# Patient Record
Sex: FEMALE | Race: WHITE | NOT HISPANIC OR LATINO | Employment: FULL TIME | URBAN - METROPOLITAN AREA
[De-identification: names, ages, dates, MRNs, and addresses within clinical notes are randomized per-mention and may not be internally consistent; named-entity substitution may affect disease eponyms.]

---

## 2017-09-19 ENCOUNTER — OFFICE VISIT (OUTPATIENT)
Dept: FAMILY MEDICINE | Facility: CLINIC | Age: 32
End: 2017-09-19
Payer: COMMERCIAL

## 2017-09-19 VITALS
BODY MASS INDEX: 40.02 KG/M2 | SYSTOLIC BLOOD PRESSURE: 118 MMHG | OXYGEN SATURATION: 98 % | HEART RATE: 74 BPM | WEIGHT: 212 LBS | RESPIRATION RATE: 16 BRPM | DIASTOLIC BLOOD PRESSURE: 74 MMHG | TEMPERATURE: 97.9 F | HEIGHT: 61 IN

## 2017-09-19 DIAGNOSIS — S93.402A SPRAIN OF LEFT ANKLE, UNSPECIFIED LIGAMENT, INITIAL ENCOUNTER: Primary | ICD-10-CM

## 2017-09-19 DIAGNOSIS — S63.502A LEFT WRIST SPRAIN, INITIAL ENCOUNTER: ICD-10-CM

## 2017-09-19 PROCEDURE — 99213 OFFICE O/P EST LOW 20 MIN: CPT | Performed by: FAMILY MEDICINE

## 2017-09-19 NOTE — MR AVS SNAPSHOT
After Visit Summary   9/19/2017    Concepción Agustin    MRN: [id number]           Patient Information     Date Of Birth          1985        Visit Information        Provider Department      9/19/2017 4:00 PM Yehuda Johnson MD [facility]        Today's Diagnoses     Sprain of left ankle, unspecified ligament, initial encounter    -  1    Left wrist sprain, initial encounter          Care Instructions      Understanding Ankle Sprain    The ankle is the joint where the leg and foot meet. Bones are held in place by connective tissue called ligaments. When ankle ligaments are stretched to the point of pain and injury, it is called an ankle sprain. A sprain can tear the ligaments. These tears can be very small but still cause pain. Ankle sprains can be mild or severe.  What causes an ankle sprain?  A sprain may occur when you twist your ankle or bend it too far. This can happen when you stumble or fall. Things that can make an ankle sprain more likely include:    Having had an ankle sprain before    Playing sports that involve running and jumping. Or playing contact sports such as football or hockey.    Wearing shoes that don t support your feet and ankles well    Having ankles with poor strength and flexibility  Symptoms of an ankle sprain  Symptoms may include:    Pain or soreness in the ankle    Swelling    Redness or bruising    Not being able to walk or put weight on the affected foot    Reduced range of motion in the ankle    A popping or tearing feeling at the time the sprain occurs    An abnormal or dislocated look to the ankle    Instability or too much range of motion in the ankle  Treatment for an ankle sprain  Treatment focuses on reducing pain and swelling, and avoiding further injury. Treatments may include:    Resting the ankle. Avoid putting weight on it. This may mean using crutches until the sprain heals.    Prescription or over-the-counter pain medicines. These help reduce  swelling and pain.    Cold packs. These help reduce pain and swelling.    Raising your ankle above your heart. This helps reduce swelling.    Wrapping the ankle with an elastic bandage or ankle brace. This helps reduce swelling and gives some support to the ankle. In rare cases, you may need a cast or boot.    Stretching and other exercises. These improve flexibility and strength.    Heat packs. These may be recommended before doing ankle exercises.  Possible complications of an ankle sprain  An ankle that has been weakened by a sprain can be more likely to have repeated sprains afterward. Doing exercises to strengthen your ankle and improve balance can reduce your risk for repeated sprains. Other possible complications are long-term (chronic) pain or an ankle that remains unstable.  When to call your healthcare provider  Call your healthcare provider right away if you have any of these:    Fever of 100.4 F (38 C) or higher, or as directed    Pain, numbness, discoloration, or coldness in the foot or toes    Pain that gets worse    Symptoms that don t get better, or get worse    New symptoms   Date Last Reviewed: 3/10/2016    0551-3051 "[employer]. [address] [place] [address]. All rights reserved. This information is not intended as a substitute for professional medical care. Always follow your healthcare professional's instructions.        Treating Ankle Sprains  Treatment will depend on how bad your sprain is. For a severe sprain, healing may take 3 months or more.  Right after your injury: Use R.I.C.E.    Rest: At first, keep weight off the ankle as much as you can. You may be given crutches to help you walk without putting weight on the ankle.    Ice: Put an ice pack on the ankle for 15 minutes. Remove the pack and wait at least 30 minutes. Repeat for up to 3 days. This helps reduce swelling.    Compression: To reduce swelling and keep the joint stable, you may need to wrap the ankle  with an elastic bandage. For more severe sprains, you may need an ankle brace or a cast.    Elevation: To reduce swelling, keep your ankle raised above your heart when you sit or lie down.  Medicine  Your healthcare provider may suggest oral non-steroidal anti-inflammatory medicine (NSAIDs), such as ibuprofen. This relieves the pain and helps reduce any swelling. Be sure to take your medicine as directed.  Contrast baths  After 3 days, soak your ankle in warm water for 30 seconds, then in cool water for 30 seconds. Go back and forth for 5 minutes. Doing this every 2 hours will help keep the swelling down.  Exercises    After about 2 to 3 weeks, you may be given exercises to strengthen the ligaments and muscles in the ankle. Doing these exercises will help prevent another ankle sprain. Exercises may include standing on your toes and then on your heels and doing ankle curls.  Ankle curls    Sit on the edge of a sturdy table or lie on your back.    Pull your toes toward you. Then point them away from you. Repeat for 2 to 3 minutes.   Date Last Reviewed: 9/28/2015 2000-2017 [employer]. [address] [place] [address]. All rights reserved. This information is not intended as a substitute for professional medical care. Always follow your healthcare professional's instructions.        Wrist Sprain  A sprain is an injury to the ligaments or capsule that holds a joint together. There are no broken bones. Most sprains take about 3 to 6 weeks to heal. If it a severe sprain where the ligament is completely torn, it can take months to recover.     Most wrist sprains are treated with a splint, wrist brace, or elastic wrap for support. Severe sprains may require surgery.  Home care    Keep your arm elevated to reduce pain and swelling. This is very important during the first 48 hours.    Apply an ice pack over the injured area for 15 to 20 minutes every 3 to 6 hours. You should do this for the first 24  to 48 hours. You can make an ice pack by filling a plastic bag that seals at the top with ice cubes and then wrapping it with a thin towel. Continue to use ice packs for relief of pain and swelling as needed. As the ice melts, be careful to avoid getting your wrap, splint, or cast wet. After 48 hours, apply heat (warm shower or warm bath) for 15 to 20 minutes several times a day, or alternate ice and heat.     You may use over-the-counter pain medicine to control pain, unless another pain medicine was prescribed. If you have chronic liver or kidney disease or ever had a stomach ulcer or GI bleeding, talk with your doctor before using these medicines.    If you were given a splint or brace, wear it for the time advised by your doctor.  Follow-up care  Follow up with your healthcare provider as advised. Any X-rays you had today don t show any broken bones, breaks, or fractures. Sometimes fractures don t show up on the first X-ray. Bruises and sprains can sometimes hurt as much as a fracture. These injuries can take time to heal completely. If your symptoms don t improve or they get worse, talk with your doctor. You may need a repeat X-ray. If X-rays were taken, you will be told of any new findings that may affect your care.  When to seek medical advice  Call your healthcare provider right away if any of these occur:    Pain or swelling increases    Fingers or hand becomes cold, blue, numb, or tingly  Date Last Reviewed: 11/20/2015 2000-2017 [employer]. [address] [place] [address], [place] [id number]. All rights reserved. This information is not intended as a substitute for professional medical care. Always follow your healthcare professional's instructions.        Understanding a Wrist Sprain    A ligament is a strong band of tissue that connects bone to bone. The wrist has many ligaments. If any of these get stretched or torn, this is called a wrist sprain. A wrist sprain can be painful. It can also limit  movement and use of the wrist and hand. Depending on the severity of the sprain, it may take a few weeks or longer for the wrist to heal.  Causes of a wrist sprain  Wrist sprains are most often caused by falling and landing on an outstretched hand. Anyone can get a wrist sprain. But the injury may be more likely in people who are very active or play sports.  Symptoms of a wrist sprain  At the time of injury, you may feel a popping or tearing in the wrist. You may have pain, redness, and swelling. You may also have some bruising. In some cases, symptoms may spread from the wrist to the hand. Until the wrist has healed, it may be hard to move or use the wrist and hand for normal tasks and activities, especially gripping and lifting.  Treating a wrist sprain  Treatment for wrist sprains may include any of the following:     Prescription or over-the-counter medicines. These help reduce pain and swelling.    A splint, brace, or cast. This is worn to support the wrist and keep the wrist from moving. You may need it for several weeks or longer, until the wrist heals.    Physical therapy and exercises. These help improve strength, flexibility, and range of motion in the wrist and hand.    Surgery. You may need surgery if the sprain is severe. For example, if the ligament is torn or if nearby tissues and bone are also injured. After surgery, you will often need to wear a splint or cast for a month or longer, until the wrist has healed.  Self-care measures  You can do several things to help relieve pain and swelling. These may include:    Limiting how much you move and use your wrist and hand    Applying an ice pack to the injured area    Keeping your wrist and hand raised (elevated) above heart level    Wrapping your wrist in an elastic bandage  Possible complications  If the injury doesn t heal properly, it has a higher chance of happening again. The injury can also become long-term (chronic). This can cause ongoing pain,  "weakness, or instability of the wrist. Over time, arthritis may develop in the wrist. This can worsen pain and cause stiffness and limited movement of the wrist and hand.        When to call your healthcare provider  Call your healthcare provider right away if you have any of these:    Fever of 100.4 F (38 C) or higher, or as directed    Symptoms that don t get better with treatment, or get worse    Hand or fingers that feel numb or very cold, turn blue or gray, or swell    Symptoms such as redness, warmth, swelling, bleeding, or drainage that occur at the incision sites. This only applies if you had surgery.    New symptoms   Date Last Reviewed: 3/10/2016    9496-3748 [employer]. [address] [place] [address], [place] [address]. All rights reserved. This information is not intended as a substitute for professional medical care. Always follow your healthcare professional's instructions.                Follow-ups after your visit        Who to contact     If you have questions or need follow up information about today's clinic visit or your schedule please contact [facility] directly at [phone].  Normal or non-critical lab and imaging results will be communicated to you by Product[employer], letter or phone within 4 business days after the clinic has received the results. If you do not hear from us within 7 days, please contact the clinic through [employer] or phone. If you have a critical or abnormal lab result, we will notify you by phone as soon as possible.  Submit refill requests through [employer] or call your pharmacy and they will forward the refill request to us. Please allow 3 business days for your refill to be completed.          Additional Information About Your Visit        [employer] Information     [employer] lets you send messages to your doctor, view your test results, renew your prescriptions, schedule appointments and more. To sign up, go to [email or web address] . Click on \"Log in\" on " "the left side of the screen, which will take you to the Welcome page. Then click on \"Sign up Now\" on the right side of the page.     You will be asked to enter the access code listed below, as well as some personal information. Please follow the directions to create your username and password.     Your access code is: [email or web address]  Expires: 2017  3:36 PM     Your access code will  in 90 days. If you need help or a new code, please call your [facility] or [phone].        Care EveryWhere ID     This is your Care EveryWhere ID. This could be used by other organizations to access your [facility] medical records  [id number]        Your Vitals Were     Pulse Temperature Respirations Height Pulse Oximetry       74 97.9  F (36.6  C) (Oral) 16 5' 1\" (1.549 m) 98%     Breastfeeding? BMI (Body Mass Index)                Yes 40.06 kg/m2           Blood Pressure from Last 3 Encounters:   17 118/74    Weight from Last 3 Encounters:   17 212 lb (96.2 kg)              Today, you had the following     No orders found for display         Today's Medication Changes          These changes are accurate as of: 17  4:50 PM.  If you have any questions, ask your nurse or doctor.               Start taking these medicines.        Dose/Directions    order for DME   Used for:  Sprain of left ankle, unspecified ligament, initial encounter, Left wrist sprain, initial encounter   Started by:  Yehuda Johnson MD        Left wrist splint and ACE wrap for left ankle   Quantity:  1 each   Refills:  0            Where to get your medicines      Some of these will need a paper prescription and others can be bought over the counter.  Ask your nurse if you have questions.     Bring a paper prescription for each of these medications     order for DME                Primary Care Provider    None Specified       No primary provider on file.        Equal Access to Services     NIMISHA TRAN: Ki ibrahim " Janinalakesha, haleyda lumarquitaadaha, thanh kalucius taveras, jerad willssole elisabet. So [facility] [phone].    ATENCIÓN: Si leonla kelsi, tiene a bucio disposición servicios gratuitos de asistencia lingüística. Celeste al [phone].    We comply with applicable federal civil rights laws and [place] laws. We do not discriminate on the basis of race, color, national origin, age, disability sex, sexual orientation or gender identity.            Thank you!     Thank you for choosing [facility]  for your care. Our goal is always to provide you with excellent care. Hearing back from our patients is one way we can continue to improve our services. Please take a few minutes to complete the written survey that you may receive in the mail after your visit with us. Thank you!             Your Updated Medication List - Protect others around you: Learn how to safely use, store and throw away your medicines at www.disposemymeds.org.          This list is accurate as of: 9/19/17  4:50 PM.  Always use your most recent med list.                   Brand Name Dispense Instructions for use Diagnosis    order for DME     1 each    Left wrist splint and ACE wrap for left ankle    Sprain of left ankle, unspecified ligament, initial encounter, Left wrist sprain, initial encounter       UNKNOWN MED DOSAGE      daily Birth control pill

## 2017-09-19 NOTE — NURSING NOTE
"Chief Complaint   Patient presents with     Work Comp     left ankle injury x August 39. 2017     Thyroid Problem     discuss       Initial /74 (BP Location: Right arm, Patient Position: Chair, Cuff Size: Adult Regular)  Pulse 74  Temp 97.9  F (36.6  C) (Oral)  Resp 16  Ht 5' 1\" (1.549 m)  Wt 212 lb (96.2 kg)  LMP   SpO2 98%  Breastfeeding? Yes  BMI 40.06 kg/m2 Estimated body mass index is 40.06 kg/(m^2) as calculated from the following:    Height as of this encounter: 5' 1\" (1.549 m).    Weight as of this encounter: 212 lb (96.2 kg).  Medication Reconciliation: complete     Hussein Shaw CMA    RUBY done from [place], not sure of her last pap and not sure of TDAP as of 9/19/2017.Hussein Shaw CMA      "

## 2017-09-19 NOTE — PROGRESS NOTES
"  SUBJECTIVE:   Concepción Agustin is a 32 year old female who presents to clinic today for the following health issues:      Musculoskeletal problem/pain      Duration: x 30 AUG 2017    Description  Location: left ankle    Intensity:  moderate    Accompanying signs and symptoms: left calf pain at times, also left wrist from falling    History  Previous similar problem: no   Previous evaluation:  none    Precipitating or alleviating factors:  Trauma or overuse: YES- fell off some steps  Aggravating factors include: standing, climbing stairs, lifting, exercise and overuse    Therapies tried and outcome: OTC pain meds, and icy hot        {additional problems for provider to add:002436}    Problem list and histories reviewed & adjusted, as indicated.  Additional history: {NONE - AS DOCUMENTED:826952::\"as documented\"}    {HIST REVIEW/ LINKS 2:625417}    Reviewed and updated as needed this visit by clinical staff     Reviewed and updated as needed this visit by Provider         {PROVIDER CHARTING PREFERENCE:687026}    "

## 2017-09-19 NOTE — PATIENT INSTRUCTIONS
Understanding Ankle Sprain    The ankle is the joint where the leg and foot meet. Bones are held in place by connective tissue called ligaments. When ankle ligaments are stretched to the point of pain and injury, it is called an ankle sprain. A sprain can tear the ligaments. These tears can be very small but still cause pain. Ankle sprains can be mild or severe.  What causes an ankle sprain?  A sprain may occur when you twist your ankle or bend it too far. This can happen when you stumble or fall. Things that can make an ankle sprain more likely include:    Having had an ankle sprain before    Playing sports that involve running and jumping. Or playing contact sports such as football or hockey.    Wearing shoes that don t support your feet and ankles well    Having ankles with poor strength and flexibility  Symptoms of an ankle sprain  Symptoms may include:    Pain or soreness in the ankle    Swelling    Redness or bruising    Not being able to walk or put weight on the affected foot    Reduced range of motion in the ankle    A popping or tearing feeling at the time the sprain occurs    An abnormal or dislocated look to the ankle    Instability or too much range of motion in the ankle  Treatment for an ankle sprain  Treatment focuses on reducing pain and swelling, and avoiding further injury. Treatments may include:    Resting the ankle. Avoid putting weight on it. This may mean using crutches until the sprain heals.    Prescription or over-the-counter pain medicines. These help reduce swelling and pain.    Cold packs. These help reduce pain and swelling.    Raising your ankle above your heart. This helps reduce swelling.    Wrapping the ankle with an elastic bandage or ankle brace. This helps reduce swelling and gives some support to the ankle. In rare cases, you may need a cast or boot.    Stretching and other exercises. These improve flexibility and strength.    Heat packs. These may be recommended before doing  ankle exercises.  Possible complications of an ankle sprain  An ankle that has been weakened by a sprain can be more likely to have repeated sprains afterward. Doing exercises to strengthen your ankle and improve balance can reduce your risk for repeated sprains. Other possible complications are long-term (chronic) pain or an ankle that remains unstable.  When to call your healthcare provider  Call your healthcare provider right away if you have any of these:    Fever of 100.4 F (38 C) or higher, or as directed    Pain, numbness, discoloration, or coldness in the foot or toes    Pain that gets worse    Symptoms that don t get better, or get worse    New symptoms   Date Last Reviewed: 3/10/2016    5684-5295 [employer]. [address] [place] [address], [place] [id number]. All rights reserved. This information is not intended as a substitute for professional medical care. Always follow your healthcare professional's instructions.        Treating Ankle Sprains  Treatment will depend on how bad your sprain is. For a severe sprain, healing may take 3 months or more.  Right after your injury: Use R.I.C.E.    Rest: At first, keep weight off the ankle as much as you can. You may be given crutches to help you walk without putting weight on the ankle.    Ice: Put an ice pack on the ankle for 15 minutes. Remove the pack and wait at least 30 minutes. Repeat for up to 3 days. This helps reduce swelling.    Compression: To reduce swelling and keep the joint stable, you may need to wrap the ankle with an elastic bandage. For more severe sprains, you may need an ankle brace or a cast.    Elevation: To reduce swelling, keep your ankle raised above your heart when you sit or lie down.  Medicine  Your healthcare provider may suggest oral non-steroidal anti-inflammatory medicine (NSAIDs), such as ibuprofen. This relieves the pain and helps reduce any swelling. Be sure to take your medicine as directed.  Contrast baths  After 3  days, soak your ankle in warm water for 30 seconds, then in cool water for 30 seconds. Go back and forth for 5 minutes. Doing this every 2 hours will help keep the swelling down.  Exercises    After about 2 to 3 weeks, you may be given exercises to strengthen the ligaments and muscles in the ankle. Doing these exercises will help prevent another ankle sprain. Exercises may include standing on your toes and then on your heels and doing ankle curls.  Ankle curls    Sit on the edge of a sturdy table or lie on your back.    Pull your toes toward you. Then point them away from you. Repeat for 2 to 3 minutes.   Date Last Reviewed: 9/28/2015 2000-2017 [employer]. [address] [place] [address], [place] [id number]. All rights reserved. This information is not intended as a substitute for professional medical care. Always follow your healthcare professional's instructions.        Wrist Sprain  A sprain is an injury to the ligaments or capsule that holds a joint together. There are no broken bones. Most sprains take about 3 to 6 weeks to heal. If it a severe sprain where the ligament is completely torn, it can take months to recover.     Most wrist sprains are treated with a splint, wrist brace, or elastic wrap for support. Severe sprains may require surgery.  Home care    Keep your arm elevated to reduce pain and swelling. This is very important during the first 48 hours.    Apply an ice pack over the injured area for 15 to 20 minutes every 3 to 6 hours. You should do this for the first 24 to 48 hours. You can make an ice pack by filling a plastic bag that seals at the top with ice cubes and then wrapping it with a thin towel. Continue to use ice packs for relief of pain and swelling as needed. As the ice melts, be careful to avoid getting your wrap, splint, or cast wet. After 48 hours, apply heat (warm shower or warm bath) for 15 to 20 minutes several times a day, or alternate ice and heat.     You may  use over-the-counter pain medicine to control pain, unless another pain medicine was prescribed. If you have chronic liver or kidney disease or ever had a stomach ulcer or GI bleeding, talk with your doctor before using these medicines.    If you were given a splint or brace, wear it for the time advised by your doctor.  Follow-up care  Follow up with your healthcare provider as advised. Any X-rays you had today don t show any broken bones, breaks, or fractures. Sometimes fractures don t show up on the first X-ray. Bruises and sprains can sometimes hurt as much as a fracture. These injuries can take time to heal completely. If your symptoms don t improve or they get worse, talk with your doctor. You may need a repeat X-ray. If X-rays were taken, you will be told of any new findings that may affect your care.  When to seek medical advice  Call your healthcare provider right away if any of these occur:    Pain or swelling increases    Fingers or hand becomes cold, blue, numb, or tingly  Date Last Reviewed: 11/20/2015 2000-2017 [employer]. [address] [place] [address]. All rights reserved. This information is not intended as a substitute for professional medical care. Always follow your healthcare professional's instructions.        Understanding a Wrist Sprain    A ligament is a strong band of tissue that connects bone to bone. The wrist has many ligaments. If any of these get stretched or torn, this is called a wrist sprain. A wrist sprain can be painful. It can also limit movement and use of the wrist and hand. Depending on the severity of the sprain, it may take a few weeks or longer for the wrist to heal.  Causes of a wrist sprain  Wrist sprains are most often caused by falling and landing on an outstretched hand. Anyone can get a wrist sprain. But the injury may be more likely in people who are very active or play sports.  Symptoms of a wrist sprain  At the time of injury, you may  feel a popping or tearing in the wrist. You may have pain, redness, and swelling. You may also have some bruising. In some cases, symptoms may spread from the wrist to the hand. Until the wrist has healed, it may be hard to move or use the wrist and hand for normal tasks and activities, especially gripping and lifting.  Treating a wrist sprain  Treatment for wrist sprains may include any of the following:     Prescription or over-the-counter medicines. These help reduce pain and swelling.    A splint, brace, or cast. This is worn to support the wrist and keep the wrist from moving. You may need it for several weeks or longer, until the wrist heals.    Physical therapy and exercises. These help improve strength, flexibility, and range of motion in the wrist and hand.    Surgery. You may need surgery if the sprain is severe. For example, if the ligament is torn or if nearby tissues and bone are also injured. After surgery, you will often need to wear a splint or cast for a month or longer, until the wrist has healed.  Self-care measures  You can do several things to help relieve pain and swelling. These may include:    Limiting how much you move and use your wrist and hand    Applying an ice pack to the injured area    Keeping your wrist and hand raised (elevated) above heart level    Wrapping your wrist in an elastic bandage  Possible complications  If the injury doesn t heal properly, it has a higher chance of happening again. The injury can also become long-term (chronic). This can cause ongoing pain, weakness, or instability of the wrist. Over time, arthritis may develop in the wrist. This can worsen pain and cause stiffness and limited movement of the wrist and hand.        When to call your healthcare provider  Call your healthcare provider right away if you have any of these:    Fever of 100.4 F (38 C) or higher, or as directed    Symptoms that don t get better with treatment, or get worse    Hand or fingers  that feel numb or very cold, turn blue or gray, or swell    Symptoms such as redness, warmth, swelling, bleeding, or drainage that occur at the incision sites. This only applies if you had surgery.    New symptoms   Date Last Reviewed: 3/10/2016    [id number] [employer]. [address] [place] [address]. All rights reserved. This information is not intended as a substitute for professional medical care. Always follow your healthcare professional's instructions.

## 2017-09-27 NOTE — PROGRESS NOTES
"SUBJECTIVE:  Concepción Agustin, a 32 year old female scheduled an appointment to discuss the following issues:     Sprain of left ankle, unspecified ligament, initial encounter  Left wrist sprain, initial encounter    Medical, social, surgical, and family histories reviewed.    Musculoskeletal problem/pain       Duration: x 30 AUG 2017    Description  Location: left ankle    Intensity:  moderate    Accompanying signs and symptoms: left calf pain at times, also left wrist from falling    History  Previous similar problem: no   Previous evaluation:  none    Precipitating or alleviating factors:  Trauma or overuse: YES- fell off some steps  Aggravating factors include: standing, climbing stairs, lifting, exercise and overuse    Therapies tried and outcome: OTC pain meds, and icy hot    On 8/30/17, Left ankle injury---tripped at bottom steps of staircase; inversion injury.  Also fell onto left side and hurt her left wrist.  Increased pain since then.  No open wound or paresthesia.  No head/neck/trunk injuries.    ROS:  See HPI.  No nausea/vomiting.  No fever/chills.  No chest pain/SOB.  No BM/urine problems.  No dizziness or syncope.      OBJECTIVE:  /74 (BP Location: Right arm, Patient Position: Chair, Cuff Size: Adult Regular)  Pulse 74  Temp 97.9  F (36.6  C) (Oral)  Resp 16  Ht 5' 1\" (1.549 m)  Wt 212 lb (96.2 kg)  LMP   SpO2 98%  Breastfeeding? Yes  BMI 40.06 kg/m2  EXAM:  GENERAL APPEARANCE: alert and no distress; no cyanosis or accessory muscle use; moist mucus membrane  EYES: Eyes grossly normal to inspection, PERRL and conjunctivae and sclerae normal  HENT: ear canals and TM's normal and nose and mouth without ulcers or lesions  NECK: no adenopathy, no asymmetry, masses, or scars and thyroid normal to palpation  RESP: lungs clear to auscultation - no rales, rhonchi or wheezes  CV: regular rates and rhythm, normal S1 S2, no S3 or S4 and no murmur, click or rub  MS: extremities normal- no gross " deformities noted; left ankle---no swelling or bruising, pain along the dorsal tendons, no bony tenderness of malleoli or metatarsals or navicular bones; gait normal, no open wound or paresthesia; left wrist---no snuff box tenderness, pain along dorsal tendons, no swelling or bruising, no bony tenderness; normal ROM all ankles and wrists.  SKIN: no suspicious lesions or rashes  NEURO: Normal strength and tone, mentation intact and speech normal; neurovascularly intact bilateral upper and lower extremities    ASSESSMENT/PLAN:  (S93.402A) Sprain of left ankle, unspecified ligament, initial encounter  (primary encounter diagnosis)  Comment: xray not indicated according to [facility] ankle rules.  Plan: order for DME       (S63.127A) Left wrist sprain, initial encounter  Plan: order for DME       Rest, ice, elevation, compression (splints given). Tylenol/Ibuprofen PRN pain.  Care instructions given.  Pt to f/up PCP if no improvement or worsening.  Warning signs and symptoms explained.

## 2017-10-09 ENCOUNTER — OFFICE VISIT (OUTPATIENT)
Dept: FAMILY MEDICINE | Facility: CLINIC | Age: 32
End: 2017-10-09
Payer: COMMERCIAL

## 2017-10-09 VITALS
WEIGHT: 211 LBS | HEIGHT: 61 IN | SYSTOLIC BLOOD PRESSURE: 111 MMHG | TEMPERATURE: 98.2 F | RESPIRATION RATE: 16 BRPM | HEART RATE: 72 BPM | DIASTOLIC BLOOD PRESSURE: 73 MMHG | BODY MASS INDEX: 39.84 KG/M2

## 2017-10-09 DIAGNOSIS — L65.9 ALOPECIA: Primary | ICD-10-CM

## 2017-10-09 LAB — TSH SERPL DL<=0.005 MIU/L-ACNC: 1.41 MU/L (ref 0.4–4)

## 2017-10-09 PROCEDURE — 84443 ASSAY THYROID STIM HORMONE: CPT | Performed by: FAMILY MEDICINE

## 2017-10-09 PROCEDURE — 36415 COLL VENOUS BLD VENIPUNCTURE: CPT | Performed by: FAMILY MEDICINE

## 2017-10-09 PROCEDURE — 99213 OFFICE O/P EST LOW 20 MIN: CPT | Performed by: FAMILY MEDICINE

## 2017-10-09 RX ORDER — ACETAMINOPHEN AND CODEINE PHOSPHATE 120; 12 MG/5ML; MG/5ML
SOLUTION ORAL
Refills: 4 | COMMUNITY
Start: 2017-09-28 | End: 2018-02-19

## 2017-10-09 NOTE — PROGRESS NOTES
"  SUBJECTIVE:   Concepción Agustin is a 32 year old female who presents to clinic today for the following health issues:    Alopecia  (primary encounter diagnosis): The patient noticed significant hair loss after birth of her twin boys,Now 5 mos ago,  notes that she has had hair loss with previous pregnancies but not as dramatic as this time. She would like to have her thyroid checked.     Problem list and histories reviewed & adjusted, as indicated.  Additional history: none    Reviewed and updated as needed this visit by clinical staff  Tobacco  Allergies  Meds  Med Hx  Surg Hx  Fam Hx  Soc Hx       History reviewed. No pertinent family history.  Thytoid disease      Social History   Substance Use Topics     Smoking status: Never Smoker     Smokeless tobacco: Never Used     Alcohol use No     Reviewed and updated as needed this visit by Provider         ROS:no other symptoms of thyroid dysfx, no systemic symptoms      This document serves as a record of the services and decisions personally performed and made by Timmy Roberts MD. It was created on his behalf by Sienna Royal, a trained medical scribe.  The creation of this document is based on the scribe's personal observations and the provider's statements to the medical scribe.  Sienna Royal, October 9, 2017 10:07 AM    OBJECTIVE:     /73 (BP Location: Right arm, Patient Position: Chair, Cuff Size: Adult Large)  Pulse 72  Temp 98.2  F (36.8  C) (Oral)  Resp 16  Ht 1.549 m (5' 1\")  Wt 95.7 kg (211 lb)  LMP   Breastfeeding? Yes  BMI 39.87 kg/m2  Body mass index is 39.87 kg/(m^2).    No thyromegaly, trachea midline    Vertex alopecia    ASSESSMENT/PLAN:     ASSESSMENT / PLAN:  (L65.9) Alopecia  (primary encounter diagnosis)  Comment: broaden database. Telogen effluvium possible  Plan: TSH with free T4 reflex Rogaine          The information in this document, created by the medical scribe for me, accurately reflects the services I personally performed " and the decisions made by me. I have reviewed and approved this document for accuracy prior to leaving the patient care area.  Timmy Roberts MD October 9, 2017 10:07 AM    Timmy Roberts MD  [facility]

## 2017-10-09 NOTE — LETTER
October 10, 2017      Concepción Agustin  [address]  [place] [facility] [address]        Dear ,      We are writing to inform you of your test results.    Thyroid tests normal. Rogaine!    Resulted Orders   TSH with free T4 reflex   Result Value Ref Range    TSH 1.41 0.40 - 4.00 mU/L       If you have any questions or concerns, please call the clinic at the number listed above.         Sincerely,        Timmy Roberts MD

## 2017-10-09 NOTE — MR AVS SNAPSHOT
"              After Visit Summary   10/9/2017    Concepción Agustin    MRN: [id number]           Patient Information     Date Of Birth          1985        Visit Information        Provider Department      10/9/2017 10:15 AM Timmy Roberts MD [facility]        Today's Diagnoses     Alopecia    -  1       Follow-ups after your visit        Who to contact     If you have questions or need follow up information about today's clinic visit or your schedule please contact [facility] directly at [phone].  Normal or non-critical lab and imaging results will be communicated to you by MyChart, letter or phone within 4 business days after the clinic has received the results. If you do not hear from us within 7 days, please contact the clinic through [email or web address] or phone. If you have a critical or abnormal lab result, we will notify you by phone as soon as possible.  Submit refill requests through [employer] or call your pharmacy and they will forward the refill request to us. Please allow 3 business days for your refill to be completed.          Additional Information About Your Visit        MyChart Information     [employer] lets you send messages to your doctor, view your test results, renew your prescriptions, schedule appointments and more. To sign up, go to [email or web address] . Click on \"Log in\" on the left side of the screen, which will take you to the Welcome page. Then click on \"Sign up Now\" on the right side of the page.     You will be asked to enter the access code listed below, as well as some personal information. Please follow the directions to create your username and password.     Your access code is: [email or web address]  Expires: 2017  3:36 PM     Your access code will  in 90 days. If you need help or a new code, please call your [facility] or [phone].        Care EveryWhere ID     This is your Care EveryWhere ID. This could be used by other organizations to " "access your [place] medical records  [id number]        Your Vitals Were     Pulse Temperature Respirations Height Breastfeeding?       72 98.2  F (36.8  C) (Oral) 16 5' 1\" (1.549 m) Yes     BMI (Body Mass Index)                   39.87 kg/m2            Blood Pressure from Last 3 Encounters:   10/09/17 111/73   09/19/17 118/74    Weight from Last 3 Encounters:   10/09/17 211 lb (95.7 kg)   09/19/17 212 lb (96.2 kg)              We Performed the Following     TSH with free T4 reflex        Primary Care Provider    None Specified       No primary provider on file.        Equal Access to Services     [facility]: Hadii francisca Christian, renetta martin, thanh kaalmada darcy, jerad franklin . So [facility] [phone].    ATENCIÓN: Si habla español, tiene a bucio disposición servicios gratuitos de asistencia lingüística. Llame al [phone].    We comply with applicable federal civil rights laws and [place] laws. We do not discriminate on the basis of race, color, national origin, age, disability, sex, sexual orientation, or gender identity.            Thank you!     Thank you for choosing [facility]  for your care. Our goal is always to provide you with excellent care. Hearing back from our patients is one way we can continue to improve our services. Please take a few minutes to complete the written survey that you may receive in the mail after your visit with us. Thank you!             Your Updated Medication List - Protect others around you: Learn how to safely use, store and throw away your medicines at www.disposemymeds.org.          This list is accurate as of: 10/9/17  1:58 PM.  Always use your most recent med list.                   Brand Name Dispense Instructions for use Diagnosis    norethindrone 0.35 MG per tablet    MICRONOR     TAKE 1 TABLET BY MOUTH DAILY START FIRST TABLET TODAY        order for DME     1 each    Left wrist splint and ACE wrap for " left ankle    Sprain of left ankle, unspecified ligament, initial encounter, Left wrist sprain, initial encounter

## 2017-10-09 NOTE — NURSING NOTE
"Chief Complaint   Patient presents with     Thyroid Problem     hair loss and joint pain in wrist and ankle        Initial /73 (BP Location: Right arm, Patient Position: Chair, Cuff Size: Adult Large)  Pulse 72  Temp 98.2  F (36.8  C) (Oral)  Resp 16  Ht 5' 1\" (1.549 m)  Wt 211 lb (95.7 kg)  LMP   Breastfeeding? Yes  BMI 39.87 kg/m2 Estimated body mass index is 39.87 kg/(m^2) as calculated from the following:    Height as of this encounter: 5' 1\" (1.549 m).    Weight as of this encounter: 211 lb (95.7 kg).  Medication Reconciliation: complete rt arm Lana Corrigan MA      "

## 2017-11-17 ENCOUNTER — TELEPHONE (OUTPATIENT)
Dept: FAMILY MEDICINE | Facility: CLINIC | Age: 32
End: 2017-11-17

## 2017-11-17 NOTE — TELEPHONE ENCOUNTER
Panel Management Review      Patient has the following on her problem list: None      Composite cancer screening  Chart review shows that this patient is due/due soon for the following Pap Smear  Summary:    Patient is due/failing the following:   PAP    Action needed:   Patient needs office visit for PAP.    Type of outreach:    Phone, left message for patient to call back.     Questions for provider review:    None                                                                                                                                    Lana Corrigan MA       Chart routed to Care Team [id number]   .

## 2018-02-08 ENCOUNTER — TELEPHONE (OUTPATIENT)
Dept: FAMILY MEDICINE | Facility: CLINIC | Age: 33
End: 2018-02-08

## 2018-02-08 NOTE — TELEPHONE ENCOUNTER
Panel Management Review      Patient has the following on her problem list: None      Composite cancer screening  Chart review shows that this patient is due/due soon for the following Pap Smear  Summary:    Patient is due/failing the following:   PAP    Action needed:   Patient needs office visit for PAP.    Type of outreach:    Phone, spoke to patient.  Pt scheduled appointment     Questions for provider review:    None                                                                                                                                    Lana Corrigan MA

## 2018-02-19 ENCOUNTER — OFFICE VISIT (OUTPATIENT)
Dept: FAMILY MEDICINE | Facility: CLINIC | Age: 33
End: 2018-02-19
Payer: COMMERCIAL

## 2018-02-19 VITALS
RESPIRATION RATE: 16 BRPM | WEIGHT: 212 LBS | HEIGHT: 61 IN | TEMPERATURE: 98.1 F | SYSTOLIC BLOOD PRESSURE: 118 MMHG | BODY MASS INDEX: 40.02 KG/M2 | OXYGEN SATURATION: 100 % | HEART RATE: 80 BPM | DIASTOLIC BLOOD PRESSURE: 78 MMHG

## 2018-02-19 DIAGNOSIS — F33.0 MILD RECURRENT MAJOR DEPRESSION (H): ICD-10-CM

## 2018-02-19 DIAGNOSIS — Z30.41 ENCOUNTER FOR SURVEILLANCE OF CONTRACEPTIVE PILLS: ICD-10-CM

## 2018-02-19 DIAGNOSIS — Z00.00 WELLNESS EXAMINATION: Primary | ICD-10-CM

## 2018-02-19 PROCEDURE — 99395 PREV VISIT EST AGE 18-39: CPT | Performed by: NURSE PRACTITIONER

## 2018-02-19 PROCEDURE — G0145 SCR C/V CYTO,THINLAYER,RESCR: HCPCS | Performed by: NURSE PRACTITIONER

## 2018-02-19 PROCEDURE — 87624 HPV HI-RISK TYP POOLED RSLT: CPT | Performed by: NURSE PRACTITIONER

## 2018-02-19 RX ORDER — CLOTRIMAZOLE 1 %
CREAM (GRAM) TOPICAL 2 TIMES DAILY
COMMUNITY
End: 2023-03-21

## 2018-02-19 RX ORDER — ACETAMINOPHEN AND CODEINE PHOSPHATE 120; 12 MG/5ML; MG/5ML
1 SOLUTION ORAL DAILY
Qty: 84 TABLET | Refills: 3 | Status: SHIPPED | OUTPATIENT
Start: 2018-02-19 | End: 2019-02-26

## 2018-02-19 ASSESSMENT — PATIENT HEALTH QUESTIONNAIRE - PHQ9
SUM OF ALL RESPONSES TO PHQ QUESTIONS 1-9: 14
SUM OF ALL RESPONSES TO PHQ QUESTIONS 1-9: 14
10. IF YOU CHECKED OFF ANY PROBLEMS, HOW DIFFICULT HAVE THESE PROBLEMS MADE IT FOR YOU TO DO YOUR WORK, TAKE CARE OF THINGS AT HOME, OR GET ALONG WITH OTHER PEOPLE: SOMEWHAT DIFFICULT

## 2018-02-19 NOTE — LETTER
February 28, 2018    Concepción Agustin  [address]  [place] [address]    Dear Concepción,  We are happy to inform you that your PAP smear result from 02/19/18 is normal.  We are now able to do a follow up test on PAP smears. The DNA test is for HPV (Human Papilloma Virus). Cervical cancer is closely linked with certain types of HPV. Your result showed no evidence of high risk HPV.  Therefore we recommend you return in 3 years for your next pap smear.  You will still need to return to the clinic every year for an annual exam and other preventive tests.  Please contact the clinic at [phone] with any questions.  Sincerely,    Susan Haase, APRN CNP/esh

## 2018-02-19 NOTE — MR AVS SNAPSHOT
After Visit Summary   2/19/2018    Concepción Agustin    MRN: [id number]           Patient Information     Date Of Birth          1985        Visit Information        Provider Department      2/19/2018 11:00 AM Haase, Susan Rachele, APRN [facility]        Today's Diagnoses     Wellness examination    -  1      Care Instructions      Preventive Health Recommendations  Female Ages 26 - 39  Yearly exam:   See your health care provider every year in order to    Review health changes.     Discuss preventive care.      Review your medicines if you your doctor has prescribed any.    Until age 30: Get a Pap test every three years (more often if you have had an abnormal result).    After age 30: Talk to your doctor about whether you should have a Pap test every 3 years or have a Pap test with HPV screening every 5 years.   You do not need a Pap test if your uterus was removed (hysterectomy) and you have not had cancer.  You should be tested each year for STDs (sexually transmitted diseases), if you're at risk.   Talk to your provider about how often to have your cholesterol checked.  If you are at risk for diabetes, you should have a diabetes test (fasting glucose).  Shots: Get a flu shot each year. Get a tetanus shot every 10 years.   Nutrition:     Eat at least 5 servings of fruits and vegetables each day.    Eat whole-grain bread, whole-wheat pasta and brown rice instead of white grains and rice.    Talk to your provider about Calcium and Vitamin D.     Lifestyle    Exercise at least 150 minutes a week (30 minutes a day, 5 days of the week). This will help you control your weight and prevent disease.    Limit alcohol to one drink per day.    No smoking.     Wear sunscreen to prevent skin cancer.    See your dentist every six months for an exam and cleaning.            Follow-ups after your visit        Follow-up notes from your care team     Return in about 1 year (around 2/19/2019) for  "Physical Exam.      Future tests that were ordered for you today     Open Future Orders        Priority Expected Expires Ordered    CBC with platelets Routine  2018    Comprehensive metabolic panel Routine  2018    Lipid panel reflex to direct LDL Fasting Routine  2018    Vitamin D Deficiency Routine  2018            Who to contact     If you have questions or need follow up information about today's clinic visit or your schedule please contact [facility] directly at [phone].  Normal or non-critical lab and imaging results will be communicated to you by Big Stagehart, letter or phone within 4 business days after the clinic has received the results. If you do not hear from us within 7 days, please contact the clinic through Laboratory Partnerst or phone. If you have a critical or abnormal lab result, we will notify you by phone as soon as possible.  Submit refill requests through STEARCLEAR or call your pharmacy and they will forward the refill request to us. Please allow 3 business days for your refill to be completed.          Additional Information About Your Visit        Big StageharCustomer BOOM (formerly [employer] BOOM) Information     STEARCLEAR lets you send messages to your doctor, view your test results, renew your prescriptions, schedule appointments and more. To sign up, go to [email or web address] . Click on \"Log in\" on the left side of the screen, which will take you to the Welcome page. Then click on \"Sign up Now\" on the right side of the page.     You will be asked to enter the access code listed below, as well as some personal information. Please follow the directions to create your username and password.     Your access code is: [email or web address]  Expires: 2018 11:29 AM     Your access code will  in 90 days. If you need help or a new code, please call your [facility] or [phone].        Care EveryWhere ID     This is your Care EveryWhere ID. This could be used by " "other organizations to access your [place] medical records  [id number]        Your Vitals Were     Pulse Temperature Respirations Height Last Period Pulse Oximetry    80 98.1  F (36.7  C) (Oral) 16 5' 1\" (1.549 m) 02/11/2018 100%    BMI (Body Mass Index)                   40.06 kg/m2            Blood Pressure from Last 3 Encounters:   02/19/18 118/78   10/09/17 111/73   09/19/17 118/74    Weight from Last 3 Encounters:   02/19/18 212 lb (96.2 kg)   10/09/17 211 lb (95.7 kg)   09/19/17 212 lb (96.2 kg)              We Performed the Following     HPV High Risk Types DNA Cervical     Pap imaged thin layer screen with HPV - recommended age 30 - 65 years (select HPV order below)          Today's Medication Changes          These changes are accurate as of 2/19/18 11:29 AM.  If you have any questions, ask your nurse or doctor.               These medicines have changed or have updated prescriptions.        Dose/Directions    norethindrone 0.35 MG per tablet   Commonly known as:  MICRONOR   This may have changed:  See the new instructions.   Used for:  Wellness examination   Changed by:  Haase, Susan Rachele, APRN CNP        Dose:  1 tablet   Take 1 tablet (0.35 mg) by mouth daily   Quantity:  84 tablet   Refills:  3            Where to get your medicines      These medications were sent to [place] [address] [place] [address] [address]    Hours:  Tech issues with their phone system Phone:  [phone]     norethindrone 0.35 MG per tablet                Primary Care Provider Office Phone # Fax #    Susan Rachele Haase, APRN -[phone] [address]  [facility] [address]        Equal Access to Services     NIMISHA PEREZ AH: Hadii francisca ibrahim Solakesha, waaxda luqadaha, qaybta kaalmada adeegyada, jerad bhandari. So [facility] [phone].    ATENCIÓN: Si habla español, tiene a bucio disposición servicios gratuitos de " asistencia lingüística. Celeste al [phone].    We comply with applicable federal civil rights laws and [place] laws. We do not discriminate on the basis of race, color, national origin, age, disability, sex, sexual orientation, or gender identity.            Thank you!     Thank you for choosing [facility]  for your care. Our goal is always to provide you with excellent care. Hearing back from our patients is one way we can continue to improve our services. Please take a few minutes to complete the written survey that you may receive in the mail after your visit with us. Thank you!             Your Updated Medication List - Protect others around you: Learn how to safely use, store and throw away your medicines at www.disposemymeds.org.          This list is accurate as of 2/19/18 11:29 AM.  Always use your most recent med list.                   Brand Name Dispense Instructions for use Diagnosis    clotrimazole 1 % cream    LOTRIMIN     Apply topically 2 times daily        norethindrone 0.35 MG per tablet    MICRONOR    84 tablet    Take 1 tablet (0.35 mg) by mouth daily    Wellness examination

## 2018-02-19 NOTE — LETTER
[facility]  [address] [place] [address]  [phone]        July 6, 2018    Concepción Agustin  [address]  [facility] [address]              Dear Concepción Agustin    This is to remind you that your fasting lab work is due.  Please fast 10-12 hours. You may drink water prior to your appointment.     You may call our office at [phone] to schedule an appointment.    Please disregard this notice if you have already had your labs drawn or made an appointment.        Sincerely,        Susan Rachele Haase APRN, CNP/ ilan

## 2018-02-19 NOTE — PROGRESS NOTES
"   SUBJECTIVE:   CC: Concepción Agustin is an 32 year old woman who presents for preventive health visit.     Physical   Annual:     Getting at least 3 servings of Calcium per day::  NO    Bi-annual eye exam::  NO    Dental care twice a year::  NO    Sleep apnea or symptoms of sleep apnea::  Daytime drowsiness    Diet::  Regular (no restrictions)    Frequency of exercise::  None    Taking medications regularly::  Yes    Medication side effects::  None    Additional concerns today::  No          Answers for HPI/ROS submitted by the patient on 2/19/2018   PHQ-2 Score: 4  If you checked off any problems, how difficult have these problems made it for you to do your work, take care of things at home, or get along with other people?: Somewhat difficult  PHQ9 TOTAL SCORE: 14    Social: 9 month twin boys, 3 and 7 year old girls. 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 Weaning from breastfeeding twins due to nipple pain.     Depression: Sleep deprived worsens symptoms but are manageable. Denies thoughts of harming self, \"whose going to take care of my kids?\". She's not currently interested in medications. PHQ 9 score of 14.      Menses: Takes Micronor daily. Periods are regular, heavy flow then tapers off. Denies previous use of contraceptives. Of note, once she's done breastfeeding, she's interested in switching to pill with estrogen.     Health maintenance: Eating choices are fast, on the go. Does not exercise regularly. History of heart disease on maternal and paternal sides. Denies visiting dentist annually, does not wear contacts/glasses. Denies constipation, diarrhea, skin concerns.     Cervical cancer screening: Pap performed today. Denies vaginal discharge.     Today's PHQ-2 Score:   PHQ-2 ( 1999 Pf[employer]) 2/19/2018   Q1: Little interest or pleasure in doing things 2   Q2: Feeling down, depressed or hopeless 2   PHQ-2 Score 4   Q1: Little interest or pleasure in doing things More than half the days   Q2: " Feeling down, depressed or hopeless More than half the days   PHQ-2 Score 4       Abuse: Current or Past(Physical, Sexual or Emotional)- No  Do you feel safe in your environment - Yes    Social History   Substance Use Topics     Smoking status: Never Smoker     Smokeless tobacco: Never Used     Alcohol use No     Alcohol Use 2/19/2018   If you drink alcohol, do you typically have greater than 3 drinks per day OR greater than 7 drinks per week?   Not applicable   No flowsheet data found.    Reviewed orders with patient.  Reviewed health maintenance and updated orders accordingly - Yes  There is no problem list on file for this patient.    History reviewed. No pertinent surgical history.    Social History   Substance Use Topics     Smoking status: Never Smoker     Smokeless tobacco: Never Used     Alcohol use No     Family History   Problem Relation Age of Onset     Thyroid Disease Mother      Coronary Artery Disease Paternal Grandfather          Current Outpatient Prescriptions   Medication Sig Dispense Refill     norethindrone (MICRONOR) 0.35 MG per tablet TAKE 1 TABLET BY MOUTH DAILY START FIRST TABLET TODAY  4     order for DME Left wrist splint and ACE wrap for left ankle (Patient not taking: Reported on 2/19/2018) 1 each 0     No Known Allergies    Mammogram not appropriate for this patient based on age.    Pertinent mammograms are reviewed under the imaging tab.  History of abnormal Pap smear: NO - age 30- 65 PAP every 3 years recommended  Last 3 Pap Results: No results found for: PAP    Reviewed and updated as needed this visit by clinical staff         Reviewed and updated as needed this visit by Provider        History reviewed. No pertinent past medical history.   History reviewed. No pertinent surgical history.    Review of Systems  C: NEGATIVE for fever, chills, change in weight  I: NEGATIVE for worrisome rashes, moles or lesions  E: NEGATIVE for vision changes or irritation  ENT: NEGATIVE for ear, mouth  "and throat problems  R: NEGATIVE for significant cough or SOB  B: NEGATIVE for masses, tenderness or discharge  CV: NEGATIVE for chest pain, palpitations or peripheral edema  GI: NEGATIVE for nausea, abdominal pain, heartburn, or change in bowel habits  : NEGATIVE for unusual urinary or vaginal symptoms. Periods are regular.  M: NEGATIVE for significant arthralgias or myalgia  N: NEGATIVE for weakness, dizziness or paresthesias  P: NEGATIVE for changes in mood or affect     This document serves as a record of the services and decisions personally performed and made by Susan Haase, CNP. It was created on her behalf by Sejal Goode, a trained medical scribe. The creation of this document is based on the provider's statements to the medical scribe.  Sejal Goode 11:09 AM February 19, 2018  OBJECTIVE:   /78 (BP Location: Right arm, Patient Position: Chair, Cuff Size: Adult Regular)  Pulse 80  Temp 98.1  F (36.7  C) (Oral)  Resp 16  Ht 1.549 m (5' 1\")  Wt 96.2 kg (212 lb)  LMP 02/11/2018  SpO2 100%  BMI 40.06 kg/m2  Physical Exam  GENERAL: healthy, alert and no distress  HENT: ear canals and TM's normal, nose and mouth without ulcers or lesions  NECK: no adenopathy, no asymmetry, masses, or scars and thyroid normal to palpation  RESP: lungs clear to auscultation - no rales, rhonchi or wheezes  BREAST: normal without masses, tenderness or nipple discharge and no palpable axillary masses or adenopathy  CV: regular rate and rhythm, normal S1 S2, no S3 or S4, no murmur, click or rub, no peripheral edema and peripheral pulses strong  ABDOMEN: soft, nontender, no hepatosplenomegaly, no masses and bowel sounds normal   (female): normal female external genitalia, normal urethral meatus, vaginal mucosa pink, moist, well rugated, and normal cervix/adnexa/uterus without masses or discharge  MS: no gross musculoskeletal defects noted, no edema  SKIN: no suspicious lesions or rashes  NEURO: Normal strength and tone, " "mentation intact and speech normal  PSYCH: mentation appears normal, affect normal/bright  ASSESSMENT/PLAN:   Concepción was seen today for physical and imm/inj.    Diagnoses and all orders for this visit:    Wellness examination  -     CBC with platelets; Future  -     Comprehensive metabolic panel; Future  -     Lipid panel reflex to direct LDL Fasting; Future  -     Vitamin D Deficiency; Future  -     Pap imaged thin layer screen with HPV - recommended age 30 - 65 years (select HPV order below)  -     HPV High Risk Types DNA Cervical    Mild recurrent major depression (H):  PHQ 9 score of 14.  Declines medication or counseling, feels it is related to her job and is looking for a different position that requires less hours.      Encounter for surveillance of contraceptive pills:  Will refill.  -     norethindrone (MICRONOR) 0.35 MG per tablet; Take 1 tablet (0.35 mg) by mouth daily      COUNSELING:  Reviewed preventive health counseling, as reflected in patient instructions       Regular exercise       Healthy diet/nutrition       Immunizations    Declined: Influenza          Contraception       reports that she has never smoked. She has never used smokeless tobacco.    Estimated body mass index is 40.06 kg/(m^2) as calculated from the following:    Height as of this encounter: 1.549 m (5' 1\").    Weight as of this encounter: 96.2 kg (212 lb).   Weight management plan: Discussed healthy diet and exercise guidelines and patient will follow up in 12 months in clinic to re-evaluate.    Counseling Resources:  ATP IV Guidelines  Pooled Cohorts Equation Calculator  Breast Cancer Risk Calculator  FRAX Risk Assessment  ICSI Preventive Guidelines  Dietary Guidelines for Americans, 2010  USDA's MyPlate  ASA Prophylaxis  Lung CA Screening  Follow up in 1 year, sooner as needed for depression.  The information in this document, created by the medical scribe for me, accurately reflects the services I personally performed and the " decisions made by me. I have reviewed and approved this document for accuracy prior to leaving the patient care area.  February 19, 2018 11:18 AM  Susan Haase, APRN [facility]

## 2018-02-19 NOTE — NURSING NOTE
"Chief Complaint   Patient presents with     Physical     Pap       Initial /78 (BP Location: Right arm, Patient Position: Chair, Cuff Size: Adult Regular)  Pulse 80  Temp 98.1  F (36.7  C) (Oral)  Resp 16  Ht 5' 1\" (1.549 m)  Wt 212 lb (96.2 kg)  LMP 02/11/2018  SpO2 100%  BMI 40.06 kg/m2 Estimated body mass index is 40.06 kg/(m^2) as calculated from the following:    Height as of this encounter: 5' 1\" (1.549 m).    Weight as of this encounter: 212 lb (96.2 kg).  Medication Reconciliation: complete   "

## 2018-02-20 ASSESSMENT — PATIENT HEALTH QUESTIONNAIRE - PHQ9: SUM OF ALL RESPONSES TO PHQ QUESTIONS 1-9: 14

## 2018-02-21 LAB
COPATH REPORT: NORMAL
PAP: NORMAL

## 2018-02-23 LAB
FINAL DIAGNOSIS: NORMAL
HPV HR 12 DNA CVX QL NAA+PROBE: NEGATIVE
HPV16 DNA SPEC QL NAA+PROBE: NEGATIVE
HPV18 DNA SPEC QL NAA+PROBE: NEGATIVE
SPECIMEN DESCRIPTION: NORMAL
SPECIMEN SOURCE CVX/VAG CYTO: NORMAL

## 2018-02-26 ENCOUNTER — OFFICE VISIT (OUTPATIENT)
Dept: URGENT CARE | Facility: URGENT CARE | Age: 33
End: 2018-02-26
Payer: COMMERCIAL

## 2018-02-26 VITALS
SYSTOLIC BLOOD PRESSURE: 118 MMHG | DIASTOLIC BLOOD PRESSURE: 72 MMHG | RESPIRATION RATE: 16 BRPM | TEMPERATURE: 98.3 F | HEIGHT: 61 IN | HEART RATE: 64 BPM | BODY MASS INDEX: 40.02 KG/M2 | WEIGHT: 212 LBS | OXYGEN SATURATION: 98 %

## 2018-02-26 DIAGNOSIS — R11.0 NAUSEA: ICD-10-CM

## 2018-02-26 DIAGNOSIS — R19.7 BLOODY DIARRHEA: Primary | ICD-10-CM

## 2018-02-26 PROCEDURE — 99213 OFFICE O/P EST LOW 20 MIN: CPT | Mod: 25 | Performed by: FAMILY MEDICINE

## 2018-02-26 PROCEDURE — 46600 DIAGNOSTIC ANOSCOPY SPX: CPT | Performed by: FAMILY MEDICINE

## 2018-02-26 PROCEDURE — 87506 IADNA-DNA/RNA PROBE TQ 6-11: CPT | Performed by: FAMILY MEDICINE

## 2018-02-26 PROCEDURE — 87329 GIARDIA AG IA: CPT | Mod: 59 | Performed by: FAMILY MEDICINE

## 2018-02-26 PROCEDURE — 87177 OVA AND PARASITES SMEARS: CPT | Performed by: FAMILY MEDICINE

## 2018-02-26 PROCEDURE — 87493 C DIFF AMPLIFIED PROBE: CPT | Mod: 59 | Performed by: FAMILY MEDICINE

## 2018-02-26 PROCEDURE — 87209 SMEAR COMPLEX STAIN: CPT | Performed by: FAMILY MEDICINE

## 2018-02-26 RX ORDER — ONDANSETRON 4 MG/1
4-8 TABLET, ORALLY DISINTEGRATING ORAL
Qty: 20 TABLET | Refills: 1 | Status: SHIPPED | OUTPATIENT
Start: 2018-02-26 | End: 2020-11-18

## 2018-02-26 NOTE — MR AVS SNAPSHOT
"              After Visit Summary   2/26/2018    Concepción Agustin    MRN: [id number]           Patient Information     Date Of Birth          1985        Visit Information        Provider Department      2/26/2018 6:05 PM Liss Amanda MD [facility]        Today's Diagnoses     Bloody diarrhea    -  1    Nausea           Follow-ups after your visit        Future tests that were ordered for you today     Open Future Orders        Priority Expected Expires Ordered    CLOSTRIDIUM DIFFICILE TOXIN B PCR Routine  3/23/2018 2/26/2018    Enteric Bacteria and Virus Panel by CLAUDIA Stool Routine  2/26/2019 2/26/2018    Giardia antigen Routine  2/26/2019 2/26/2018    Ova and Parasite Exam Routine Routine  2/26/2019 2/26/2018            Who to contact     If you have questions or need follow up information about today's clinic visit or your schedule please contact [facility] directly at [phone].  Normal or non-critical lab and imaging results will be communicated to you by MyChart, letter or phone within 4 business days after the clinic has received the results. If you do not hear from us within 7 days, please contact the clinic through MyChart or phone. If you have a critical or abnormal lab result, we will notify you by phone as soon as possible.  Submit refill requests through Adynxx or call your pharmacy and they will forward the refill request to us. Please allow 3 business days for your refill to be completed.          Additional Information About Your Visit        CyberSettlehart Information     Adynxx lets you send messages to your doctor, view your test results, renew your prescriptions, schedule appointments and more. To sign up, go to [email or web address] . Click on \"Log in\" on the left side of the screen, which will take you to the Welcome page. Then click on \"Sign up Now\" on the right side of the page.     You will be asked to enter the access code listed below, as well " "as some personal information. Please follow the directions to create your username and password.     Your access code is: [email or web address]  Expires: 2018 11:29 AM     Your access code will  in 90 days. If you need help or a new code, please call your [place] clinic or [phone].        Care EveryWhere ID     This is your Care EveryWhere ID. This could be used by other organizations to access your [place] medical records  [id number]        Your Vitals Were     Pulse Temperature Respirations Height Last Period Pulse Oximetry    64 98.3  F (36.8  C) (Oral) 16 5' 1\" (1.549 m) 2018 98%    BMI (Body Mass Index)                   40.06 kg/m2            Blood Pressure from Last 3 Encounters:   18 118/72   18 118/78   10/09/17 111/73    Weight from Last 3 Encounters:   18 212 lb (96.2 kg)   18 212 lb (96.2 kg)   10/09/17 211 lb (95.7 kg)              We Performed the Following     ANOSCOPY W/WO BRUSH/WASH          Today's Medication Changes          These changes are accurate as of 18 11:05 PM.  If you have any questions, ask your nurse or doctor.               Start taking these medicines.        Dose/Directions    ondansetron 4 MG ODT tab   Commonly known as:  ZOFRAN ODT   Used for:  Nausea   Started by:  Liss Amanda MD        Dose:  4-8 mg   Take 1-2 tablets (4-8 mg) by mouth 3 times daily (before meals)   Quantity:  20 tablet   Refills:  1            Where to get your medicines      These medications were sent to [address] [address] [facility]  [address] [facility] [address]    Hours:  Tech issues with their phone system Phone:  [phone]     ondansetron 4 MG ODT tab                Primary Care Provider Office Phone # Fax #    Susan Rachele Haase, APRN -[phone] [address] [facility] [address]        Equal Access to Services     NIMISHA PEREZ AH: renetta Hightowerqadaha, " thanh sigridjanisveronika mcdanielstomi jerad dulcein hayaan adeeg kharash la'aan ah. So [facility] [phone].    ATENCIÓN: Si zoey dolan, tiene a bucio disposición servicios gratuitos de asistencia lingüística. Celeste al [phone].    We comply with applicable federal civil rights laws and [place] laws. We do not discriminate on the basis of race, color, national origin, age, disability, sex, sexual orientation, or gender identity.            Thank you!     Thank you for choosing [facility]  for your care. Our goal is always to provide you with excellent care. Hearing back from our patients is one way we can continue to improve our services. Please take a few minutes to complete the written survey that you may receive in the mail after your visit with us. Thank you!             Your Updated Medication List - Protect others around you: Learn how to safely use, store and throw away your medicines at www.disposemymeds.org.          This list is accurate as of 2/26/18 11:05 PM.  Always use your most recent med list.                   Brand Name Dispense Instructions for use Diagnosis    clotrimazole 1 % cream    LOTRIMIN     Apply topically 2 times daily        norethindrone 0.35 MG per tablet    MICRONOR    84 tablet    Take 1 tablet (0.35 mg) by mouth daily    Encounter for surveillance of contraceptive pills       ondansetron 4 MG ODT tab    ZOFRAN ODT    20 tablet    Take 1-2 tablets (4-8 mg) by mouth 3 times daily (before meals)    Nausea

## 2018-02-27 DIAGNOSIS — R19.7 BLOODY DIARRHEA: ICD-10-CM

## 2018-02-27 LAB
C COLI+JEJUNI+LARI FUSA STL QL NAA+PROBE: ABNORMAL
C DIFF TOX B STL QL: NEGATIVE
EC STX1 GENE STL QL NAA+PROBE: NOT DETECTED
EC STX2 GENE STL QL NAA+PROBE: NOT DETECTED
ENTERIC PATHOGEN COMMENT: ABNORMAL
NOROV GI+II ORF1-ORF2 JNC STL QL NAA+PR: NOT DETECTED
RVA NSP5 STL QL NAA+PROBE: NOT DETECTED
SALMONELLA SP RPOD STL QL NAA+PROBE: NOT DETECTED
SHIGELLA SP+EIEC IPAH STL QL NAA+PROBE: NOT DETECTED
SPECIMEN SOURCE: NORMAL
V CHOL+PARA RFBL+TRKH+TNAA STL QL NAA+PR: NOT DETECTED
Y ENTERO RECN STL QL NAA+PROBE: NOT DETECTED

## 2018-02-27 NOTE — PROGRESS NOTES
SUBJECTIVE:  Chief Complaint   Patient presents with     Abdominal Pain     and blood in stool, diarrhea x 3 days, also happened in the past 2-3 months too     Concepción Agustin is a 32 year old female whose symptoms began 2 days ago and include abdominal pain entire abdomen, cramping and diarrhea.   Patient denies vomiting, fever, chills, arthralgias, URI symptoms and cough  Symptoms are sudden onset, still present and constant and moderate.      Associated symptoms:  Pain:  Mild diffuse, generalized crampy abdominal pain  Fever: no noted fevers  Diarrhea:  consists of 4 stools/day and is persisting  Stools: runny, loose and with blood mixed in  Appetite: decreased  Vomitin times  , but some nausea  Risk factors:  Had similar episode 2-3 months ago that stopped after 3 days- no evaluation was performed  Patient denies sick contacts, possible bad food exposure, travel , recent antibiotic use, recent hospitalization, recent medication changes and hx of IBS    PMH  Patient Active Problem List   Diagnosis     Mild recurrent major depression (H)     Encounter for surveillance of contraceptive pills       ALLERGIES:  Review of patient's allergies indicates no known allergies.      Current Outpatient Prescriptions on File Prior to Visit:  clotrimazole (LOTRIMIN) 1 % cream Apply topically 2 times daily   norethindrone (MICRONOR) 0.35 MG per tablet Take 1 tablet (0.35 mg) by mouth daily     No current facility-administered medications on file prior to visit.     Social History   Substance Use Topics     Smoking status: Never Smoker     Smokeless tobacco: Never Used     Alcohol use No       Family History   Problem Relation Age of Onset     Thyroid Disease Mother      Coronary Artery Disease Paternal Grandfather          ROS:  CONSTITUTIONAL:NEGATIVE for fever, chills,   INTEGUMENTARY/SKIN: NEGATIVE for worrisome rashes,  EYES: NEGATIVE for vision changes or irritation  ENT/MOUTH: NEGATIVE for ear, mouth and throat  "problems  RESP:NEGATIVE for significant cough or SOB  : negative for dysuria, frequency   and hematuria    OBJECTIVE:  /72 (BP Location: Right arm, Patient Position: Chair, Cuff Size: Adult Regular)  Pulse 64  Temp 98.3  F (36.8  C) (Oral)  Resp 16  Ht 5' 1\" (1.549 m)  Wt 212 lb (96.2 kg)  LMP 02/11/2018  SpO2 98%  BMI 40.06 kg/m2    GENERAL APPEARANCE: fatigued, alert and mild  distress  EYES: EOMI,  PERRL, conjunctiva clear  HENT: ear canals and TM's normal.  Nose and mouth without ulcers, erythema or lesions  NECK: supple, nontender, no lymphadenopathy  RESP: lungs clear to auscultation - no rales, rhonchi or wheezes  CV: regular rates and rhythm, normal S1 S2, no murmur noted  ABDOMEN:  soft, mild, diffuse generalized tenderness, no HSM or masses and bowel sounds active   Extremities:  Motor, sensation intact,  Normal ROM  NEURO: Normal strength and tone, sensory exam grossly normal,  normal speech and mentation  SKIN: no suspicious lesions or rashes  Rectal-  No hemorrhoids externally-  Small, non- bleeding hemorrhoids internally-  No fissure-  No bleeding site noted.  Loose stool, no blood noted    ASSESSMENT:  Bloody diarrhea     - CLOSTRIDIUM DIFFICILE TOXIN B PCR; Future  - Enteric Bacteria and Virus Panel by CLAUDIA Stool; Future  - Giardia antigen; Future  - Ova and Parasite Exam Routine; Future  - ANOSCOPY W/WO BRUSH/WASH      Stool testing for enteric pathogens ordered-  Will be called if positive results   Discussed that if stool shows no infection that follow-up with gastroenterology would be recommended for further evaluation  Discussed possibility of Ulcerative colitis, Crohn's or other bowel disorder  Follow-up with PCP if  Other concerns  Advise careful hand washing to reduce the risk of passing the illness to others in close contact      Nausea     - ondansetron (ZOFRAN ODT) 4 MG ODT tab; Take 1-2 tablets (4-8 mg) by mouth 3 times daily (before meals)     Diet: small amounts clear " fluids frequently,soups,juices,water,advance diet as tolerated  Rest as much as possible.  Patient was advised to go to the ER if there is persistent vomiting and unable to keep down liquids  and/or severe weakness/ listlessness.       Procedure note:    Anoscopy was performed with an anoscope approximately 10 cm length.  The anus was visually examined with spreading of bilateral gluteal masses.  No external hemorrhoids were noted, no fissures, no lesions.  The lubricated anoscope was then introduced into anus to its full extent and the internal surfaces of the rectum were observed as the anoscope was gradually withdrawn.    No blood was noted in the rectal vault, though there was a small amount of liquid stool.  No site of bleeding was identified.  There are a few small 3 x 4 mm hemorrhoids on the wall of the rectal vault, without bleeding, without inflammation.  No complications

## 2018-02-27 NOTE — NURSING NOTE
"Chief Complaint   Patient presents with     Abdominal Pain     and blood in stool, diarrhea x 3 days, also happened in the past 2-3 months too       Initial /72 (BP Location: Right arm, Patient Position: Chair, Cuff Size: Adult Regular)  Pulse 64  Temp 98.3  F (36.8  C) (Oral)  Resp 16  Ht 5' 1\" (1.549 m)  Wt 212 lb (96.2 kg)  LMP 02/11/2018  SpO2 98%  BMI 40.06 kg/m2 Estimated body mass index is 40.06 kg/(m^2) as calculated from the following:    Height as of this encounter: 5' 1\" (1.549 m).    Weight as of this encounter: 212 lb (96.2 kg).  Medication Reconciliation: ewa Shaw CMA      "

## 2018-02-28 LAB
G LAMBLIA AG STL QL IA: NORMAL
O+P STL MICRO: NORMAL
SPECIMEN SOURCE: NORMAL
SPECIMEN SOURCE: NORMAL

## 2018-10-09 ENCOUNTER — TELEPHONE (OUTPATIENT)
Dept: FAMILY MEDICINE | Facility: CLINIC | Age: 33
End: 2018-10-09

## 2018-10-09 NOTE — LETTER
[facility]  [id number] [place] [id number]  [phone]  October 22, 2018    Concepción Agustin  [address]  [facility] [address]    Dear Concepción,    I care about your health and have reviewed your health plan. I have reviewed your medical conditions, medication list, and lab results and am making recommendations based on this review, to better manage your health.    You are in particular need of attention regarding:  -Depression    I am recommending that you:  -schedule a FOLLOWUP OFFICE APPOINTMENT with me.         Please call us at [phone] (or use [employer]) to address the above recommendations.     Thank you for trusting [facility] and we appreciate the opportunity to serve you.  We look forward to supporting your healthcare needs in the future.    Healthy Regards,    Susan Haase CNP

## 2018-10-09 NOTE — TELEPHONE ENCOUNTER
Panel Management Review      Patient has the following on her problem list:     Depression / Dysthymia review    Measure:  Needs PHQ-9 score of 4 or less during index window.  Administer PHQ-9 and if score is 5 or more, send encounter to provider for next steps.    5 - 7 month window range: 6/19/2018  - 10/19/2018    PHQ-9 SCORE 2/19/2018   Total Score MyChart 14 (Moderate depression)   Total Score 14       If PHQ-9 recheck is 5 or more, route to provider for next steps.    Patient is due for:  PHQ9      Composite cancer screening  Chart review shows that this patient is due/due soon for the following None  Summary:    Patient is due/failing the following:   PHQ9    Action needed:   Patient needs office visit for depression follow up.    Type of outreach:    Type of outreach:  Phone, left message for patient to call back.         Questions for provider review:    None                                                                                                                                    Mona Schmitz       Chart routed to Care Team .

## 2018-10-22 NOTE — TELEPHONE ENCOUNTER
Type of outreach:  Phone, left message for patient to call back.  and Sent letter.     Rachel Jewell CMA on 10/22/2018 at 1:28 PM

## 2019-02-25 DIAGNOSIS — Z30.41 ENCOUNTER FOR SURVEILLANCE OF CONTRACEPTIVE PILLS: ICD-10-CM

## 2019-02-25 NOTE — TELEPHONE ENCOUNTER
"Last Written Prescription Date:  02/19/18  Last Fill Quantity: 84,  # refills: 3   Last office visit: 2/19/2018 with prescribing provider:  Susan Haase   Future Office Visit:      Requested Prescriptions   Pending Prescriptions Disp Refills     norethindrone (MICRONOR) 0.35 MG tablet 84 tablet 3     Sig: Take 1 tablet (0.35 mg) by mouth daily    Contraceptives Protocol Failed - 2/25/2019  9:00 AM       Failed - Recent (12 mo) or future (30 days) visit within the authorizing provider's specialty    Patient had office visit in the last 12 months or has a visit in the next 30 days with authorizing provider or within the authorizing provider's specialty.  See \"Patient Info\" tab in inbasket, or \"Choose Columns\" in Meds & Orders section of the refill encounter.             Passed - Patient is not a current smoker if age is 35 or older       Passed - Medication is active on med list       Passed - No active pregnancy on record       Passed - No positive pregnancy test in past 12 months            "

## 2019-02-26 RX ORDER — ACETAMINOPHEN AND CODEINE PHOSPHATE 120; 12 MG/5ML; MG/5ML
0.35 SOLUTION ORAL DAILY
Qty: 28 TABLET | Refills: 0 | Status: SHIPPED | OUTPATIENT
Start: 2019-02-26 | End: 2021-12-02

## 2019-02-26 NOTE — TELEPHONE ENCOUNTER
Visit due:Yes  Quantity sent: one month  Pharmacy informed: Yes  Prescription approved per [facility] Refill Protocol.  Angie Caballero RN, BSN

## 2019-02-28 DIAGNOSIS — Z30.41 ENCOUNTER FOR SURVEILLANCE OF CONTRACEPTIVE PILLS: ICD-10-CM

## 2019-03-01 RX ORDER — ACETAMINOPHEN AND CODEINE PHOSPHATE 120; 12 MG/5ML; MG/5ML
SOLUTION ORAL DAILY
Status: SHIPPED
Start: 2019-03-01 | End: 2020-11-18

## 2020-08-13 ENCOUNTER — VIRTUAL VISIT (OUTPATIENT)
Dept: FAMILY MEDICINE | Facility: OTHER | Age: 35
End: 2020-08-13

## 2020-08-13 DIAGNOSIS — Z20.822 SUSPECTED 2019 NOVEL CORONAVIRUS INFECTION: Primary | ICD-10-CM

## 2020-08-13 NOTE — PROGRESS NOTES
"Date: 2020 07:59:13  Clinician: Senia Gamble  Clinician NPI: [id number]  Patient: Concepción Agustin  Patient : 1985  Patient Address: [address] [place] [address]  Patient Phone: [phone]  Visit Protocol: URI  Patient Summary:  Concepción is a 35 year old ( : 1985 ) female who initiated a Visit for COVID-19 (Coronavirus) evaluation and screening. When asked the question \"Please sign me up to receive news, health information and promotions. \", Concepción responded \"Yes\".    Concepción states her symptoms started suddenly 3-4 days ago.   Her symptoms consist of a headache, a sore throat, a cough, nasal congestion, rhinitis, wheezing, and malaise. She is experiencing difficulty breathing due to nasal congestion but she is not short of breath.   Symptom details     Nasal secretions: The color of her mucus is white, yellow, and clear.    Cough: Concepción coughs a few times an hour and her cough is not more bothersome at night. Phlegm comes into her throat when she coughs. She believes her cough is caused by post-nasal drip. The color of the phlegm is white, yellow, and clear.     Sore throat: Concepción reports having moderate throat pain (4-6 on a 10 point pain scale), does not have exudate on her tonsils, and can swallow liquids. She is not sure if the lymph nodes in her neck are enlarged. A rash has not appeared on the skin since the sore throat started.     Wheezing: Concepción has not ever been diagnosed with asthma. Additional wheezing details as reported by the patient (free text): sort of like empty want to be coughs that just don't make it to a cough (a cough will usually come later)       Headache: She states the headache is mild (1-3 on a 10 point pain scale).      Concepción denies having ear pain, chills, nausea, teeth pain, ageusia, diarrhea, vomiting, myalgias, anosmia, facial pain or pressure, and fever. She also denies having a sinus infection within the past year, taking antibiotic medication in the past " month, double sickening (worsening symptoms after initial improvement), and having recent facial or sinus surgery in the past 60 days.   Precipitating events  Concepción is not sure if she has been exposed to someone with strep throat. She has not recently been exposed to someone with influenza. Concepción has been in close contact with the following high risk individuals: children under the age of 5.   Pertinent COVID-19 (Coronavirus) information  In the past 14 days, Concepción has not worked in a congregate living setting.   She does not work or volunteer as healthcare worker or a  and does not work or volunteer in a healthcare facility.   Concepción also has not lived in a congregate living setting in the past 14 days. She does not live with a healthcare worker.   Concepción has not had a close contact with a laboratory-confirmed COVID-19 patient within 14 days of symptom onset.   Since December 2019, Concepción and has not had upper respiratory infection or influenza-like illness. Has not been diagnosed with lab-confirmed COVID-19 test   Pertinent medical history  Concepción does not get yeast infections when she takes antibiotics.   Concepción needs a return to work/school note.   Weight: 248 lbs   Concepción does not smoke or use smokeless tobacco.   She denies pregnancy and denies breastfeeding. She has menstruated in the past month.   Weight: 248 lbs    MEDICATIONS: norethindrone-ethinyl estradiol oral, ALLERGIES: NKDA  Clinician Response:  Dear Concepción,   Your symptoms show that you may have coronavirus (COVID-19). This illness can cause fever, cough and trouble breathing. Many people get a mild case and get better on their own. Some people can get very sick.  What should I do?  We would like to test you for this virus.   1. Please call [phone] to schedule your visit. Explain that you were referred by [facility] to have a COVID-19 test. Be ready to share your [facility] visit ID number.  The following will serve as your written order for this COVID  "Test, ordered by me, for the indication of suspected COVID [Z20.828]: The test will be ordered in [employer], our electronic health record, after you are scheduled. It will show as ordered and authorized by Thang Johnson MD.  Order: COVID-19 (Coronavirus) [facility].      2. When it's time for your COVID test:  Stay at least 6 feet away from others. (If someone will drive you to your test, stay in the backseat, as far away from the  as you can.)   Cover your mouth and nose with a mask, tissue or washcloth.  Go straight to the testing site. Don't make any stops on the way there or back.      3.Starting now: Stay home and away from others (self-isolate) until:   You've had no fever---and no medicine that reduces fever---for one full day (24 hours). And...   Your other symptoms have gotten better. For example, your cough or breathing has improved. And...   At least 10 days have passed since your symptoms started.       During this time, don't leave the house except for testing or medical care.   Stay in your own room, even for meals. Use your own bathroom if you can.   Stay away from others in your home. No hugging, kissing or shaking hands. No visitors.  Don't go to work, school or anywhere else.    Clean \"high touch\" surfaces often (doorknobs, counters, handles, etc.). Use a household cleaning spray or wipes. You'll find a full list of  on the EPA website: www.epa.gov/pesticide-registration/list-n-disinfectants-use-against-sars-cov-2.   Cover your mouth and nose with a mask, tissue or washcloth to avoid spreading germs.  Wash your hands and face often. Use soap and water.  Caregivers in these groups are at risk for severe illness due to COVID-19:  o People 65 years and older  o People who live in a nursing home or long-term care facility  o People with chronic disease (lung, heart, cancer, diabetes, kidney, liver, immunologic)  o People who have a weakened immune system, including " those who:   Are in cancer treatment  Take medicine that weakens the immune system, such as corticosteroids  Had a bone marrow or organ transplant  Have an immune deficiency  Have poorly controlled HIV or AIDS  Are obese (body mass index of 40 or higher)  Smoke regularly   o Caregivers should wear gloves while washing dishes, handling laundry and cleaning bedrooms and bathrooms.  o Use caution when washing and drying laundry: Don't shake dirty laundry, and use the warmest water setting that you can.  o For more tips, go to www.cdc.gov/coronavirus/2019-ncov/downloads/10Things.pdf.       How can I take care of myself?   Get lots of rest. Drink extra fluids (unless a doctor has told you not to).   Take Tylenol (acetaminophen) for fever or pain. If you have liver or kidney problems, ask your family doctor if it's okay to take Tylenol.   Adults can take either:    650 mg (two 325 mg pills) every 4 to 6 hours, or...   1,000 mg (two 500 mg pills) every 8 hours as needed.    Note: Don't take more than 3,000 mg in one day. Acetaminophen is found in many medicines (both prescribed and over-the-counter medicines). Read all labels to be sure you don't take too much.   For children, check the Tylenol bottle for the right dose. The dose is based on the child's age or weight.    If you have other health problems (like cancer, heart failure, an organ transplant or severe kidney disease): Call your specialty clinic if you don't feel better in the next 2 days.       Know when to call 911. Emergency warning signs include:    Trouble breathing or shortness of breath Pain or pressure in the chest that doesn't go away Feeling confused like you haven't felt before, or not being able to wake up Bluish-colored lips or face.  Where can I get more information?    [facility] [place] -- About COVID-19: [email or web address]   CDC -- What to Do If You're Sick: www.cdc.gov/coronavirus/2019-ncov/about/steps-when-sick.html   CDC -- Ending  Home Isolation: www.cdc.gov/coronavirus/2019-ncov/hcp/disposition-in-home-patients.html   CDC -- Caring for Someone: www.cdc.gov/coronavirus/2019-ncov/if-you-are-sick/care-for-someone.html   [facility] -- Interim Guidance for Hospital Discharge to Home: www.[facility].mn./diseases/coronavirus/hcp/hospdischarge.pdf   [facility] clinical trials (COVID-19 research studies): [email or web address]-clinical-trials    Below are the COVID-19 hotlines at the [place] Department of Health ([facility]). Interpreters are available.    For health questions: Call [phone] or 1-101.376.3595 (7 a.m. to 7 p.m.) For questions about schools and childcare: Call 800-383-5009 or 1-942.543.2198 (7 a.m. to 7 p.m.)    Diagnosis: Cough  Diagnosis ICD: R05

## 2020-08-14 DIAGNOSIS — Z20.822 SUSPECTED 2019 NOVEL CORONAVIRUS INFECTION: ICD-10-CM

## 2020-08-14 PROCEDURE — U0003 INFECTIOUS AGENT DETECTION BY NUCLEIC ACID (DNA OR RNA); SEVERE ACUTE RESPIRATORY SYNDROME CORONAVIRUS 2 (SARS-COV-2) (CORONAVIRUS DISEASE [COVID-19]), AMPLIFIED PROBE TECHNIQUE, MAKING USE OF HIGH THROUGHPUT TECHNOLOGIES AS DESCRIBED BY CMS-2020-01-R: HCPCS | Performed by: FAMILY MEDICINE

## 2020-08-17 LAB
SARS-COV-2 RNA SPEC QL NAA+PROBE: NOT DETECTED
SPECIMEN SOURCE: NORMAL

## 2020-11-18 ENCOUNTER — VIRTUAL VISIT (OUTPATIENT)
Dept: FAMILY MEDICINE | Facility: CLINIC | Age: 35
End: 2020-11-18
Payer: COMMERCIAL

## 2020-11-18 VITALS — HEIGHT: 62 IN | BODY MASS INDEX: 45.64 KG/M2 | WEIGHT: 248 LBS

## 2020-11-18 DIAGNOSIS — F33.0 MILD RECURRENT MAJOR DEPRESSION (H): Primary | ICD-10-CM

## 2020-11-18 DIAGNOSIS — E66.01 MORBID OBESITY (H): ICD-10-CM

## 2020-11-18 PROCEDURE — 99213 OFFICE O/P EST LOW 20 MIN: CPT | Mod: 95 | Performed by: NURSE PRACTITIONER

## 2020-11-18 RX ORDER — FLUOXETINE 10 MG/1
CAPSULE ORAL
Qty: 60 CAPSULE | Refills: 1 | Status: SHIPPED | OUTPATIENT
Start: 2020-11-18 | End: 2020-12-31 | Stop reason: DRUGHIGH

## 2020-11-18 ASSESSMENT — ANXIETY QUESTIONNAIRES
3. WORRYING TOO MUCH ABOUT DIFFERENT THINGS: NEARLY EVERY DAY
GAD7 TOTAL SCORE: 11
6. BECOMING EASILY ANNOYED OR IRRITABLE: NEARLY EVERY DAY
5. BEING SO RESTLESS THAT IT IS HARD TO SIT STILL: SEVERAL DAYS
2. NOT BEING ABLE TO STOP OR CONTROL WORRYING: SEVERAL DAYS
1. FEELING NERVOUS, ANXIOUS, OR ON EDGE: SEVERAL DAYS
IF YOU CHECKED OFF ANY PROBLEMS ON THIS QUESTIONNAIRE, HOW DIFFICULT HAVE THESE PROBLEMS MADE IT FOR YOU TO DO YOUR WORK, TAKE CARE OF THINGS AT HOME, OR GET ALONG WITH OTHER PEOPLE: SOMEWHAT DIFFICULT
7. FEELING AFRAID AS IF SOMETHING AWFUL MIGHT HAPPEN: NOT AT ALL

## 2020-11-18 ASSESSMENT — MIFFLIN-ST. JEOR: SCORE: 1773.17

## 2020-11-18 ASSESSMENT — PATIENT HEALTH QUESTIONNAIRE - PHQ9
5. POOR APPETITE OR OVEREATING: MORE THAN HALF THE DAYS
SUM OF ALL RESPONSES TO PHQ QUESTIONS 1-9: 18

## 2020-11-18 NOTE — PROGRESS NOTES
"Concepción Agustin is a 35 year old female who is being evaluated via a billable video visit.      The patient has been notified of following:     \"This video visit will be conducted via a call between you and your physician/provider. We have found that certain health care needs can be provided without the need for an in-person physical exam.  This service lets us provide the care you need with a video conversation.  If a prescription is necessary we can send it directly to your pharmacy.  If lab work is needed we can place an order for that and you can then stop by our lab to have the test done at a later time.    Video visits are billed at different rates depending on your insurance coverage.  Please reach out to your insurance provider with any questions.    If during the course of the call the physician/provider feels a video visit is not appropriate, you will not be charged for this service.\"    Patient has given verbal consent for Video visit? Yes  How would you like to obtain your AVS? MyChart  If you are dropped from the video visit, the video invite should be resent to: Text to cell phone: [phone]  Will anyone else be joining your video visit? No  Subjective     Concepción Agustin is a 35 year old female who presents today via video visit for the following health issues:    HPI     Abnormal Mood Symptoms  Onset/Duration: 3 years   Description:   Depression (if yes, do PHQ-9): YES  Anxiety (if yes, do OREN-7): YES  Accompanying Signs & Symptoms:  Still participating in activities that you used to enjoy: no  Fatigue: YES  Irritability: YES  Difficulty concentrating: YES  Changes in appetite: YES over eating, binge eat when stressed.    Problems with sleep: YES less sleep   Heart racing/beating fast: no  Abnormally elevated, expansive, or irritable mood:   Persistently increased activity or energy: no  Thoughts of hurting yourself or others:  Not curntelly but possible   History:  Recent stress or major life event: " YES everything that's happening now, homes schooling and working from home     Prior depression or anxiety: None  Family history of depression or anxiety: YES  Alcohol/drug use: no  Difficulty sleeping: YES  Precipitating or alleviating factors:  Therapies tried and outcome: none  Feels that she has had issues with depression since the birth of her twin sons 3 years ago.  Has never been on medication or attended counseling.  Has depression in her family, her mother has bipolar disorder, her sister takes fluoxetine with good relief.    PHQ 9 score of 18, has random thoughts of harming self, no plan, would never do this because of her children. OREN 7 score of 11.   Sleep: average 5-6 hours per night. Has difficulty going to sleep, wakes up frequently. Has taken melatonin which has been helpful.    Is feeling overwhelmed with working full time and home schooling.   Is interested in virtual counseling.   PHQ 2/19/2018   PHQ-9 Total Score 14   Q9: Thoughts of better off dead/self-harm past 2 weeks Several days     OREN-7 SCORE 11/18/2020   Total Score 11       Video Start Time: 1509      Review of Systems   CONSTITUTIONAL: NEGATIVE for fever, chills, change in weight  RESP: NEGATIVE for significant cough or SOB  CV: NEGATIVE for chest pain, palpitations or peripheral edema  PSYCHIATRIC: see HPI      Objective       Vitals:  No vitals were obtained today due to virtual visit.    Physical Exam     GENERAL: Healthy, alert and no distress  RESP: No audible wheeze, cough, or visible cyanosis.  No visible retractions or increased work of breathing.    SKIN: Visible skin clear. No significant rash, abnormal pigmentation or lesions.  NEURO:   Mentation and speech appropriate for age.  PSYCH: Mentation appears normal, affect normal/bright, judgement and insight intact, normal speech and appearance well-groomed.          Assessment & Plan     Mild recurrent major depression (H): PHQ 9 score of 18, has random thoughts of harming  "self, no plan, would never do this because of her children. OREN 7 score of 11.   - FLUoxetine (PROZAC) 10 MG capsule  Dispense: 60 capsule; Refill: 1  Discussed risks and benefits of medication, need to take on a daily basis, will take at least 2-4 weeks to notice decrease in depression or anxiety, if symptoms of depression worsen stop taking medication and notify the clinic.  Is aware of emergency resources.  Encouraged increased in exercise and counseling.Is interested in meeting with the [facility].  Attached handout with emergency resources.     Morbid obesity (H): will discuss at next visit.         BMI:   Estimated body mass index is 45.36 kg/m  as calculated from the following:    Height as of this encounter: 1.575 m (5' 2\").    Weight as of this encounter: 112.5 kg (248 lb).            Return in about 6 weeks (around 12/30/2020) for Routine Visit, depression check.    Susan Haase, APRN CNP  [facility]      Video-Visit Details    Type of service:  Video Visit    Video End Time:1524    Originating Location (pt. Location): Home    Distant Location (provider location):  [facility] [place]     Platform used for Video Visit: Martina"

## 2020-11-18 NOTE — PATIENT INSTRUCTIONS
Patient Education     Depression  Depression is one of the most common mental health problems today. It is not just a state of unhappiness or sadness. It is a true disease. The cause seems to be related to a decrease in chemicals that transmit signals in the brain. Having a family history of depression, alcoholism, or suicide increases the risk. Chronic illness, chronic pain, migraine headaches, and high emotional stress also increase the risk.  Depression is something we tend to recognize in others, but may have a hard time seeing in ourselves. It can show in many physical and emotional ways:    Loss of appetite    Overeating    Not being able to sleep    Sleeping too much    Tiredness not related to physical exertion    Restlessness or irritability    Slowness of movement or speech    Feeling depressed or withdrawn    Loss of interest in things you once enjoyed    Trouble concentrating, poor memory, trouble making decisions    Thoughts of harming or killing oneself, or thoughts that life is not worth living    Low self-esteem  The treatment for depression may include both medicine and psychotherapy. Antidepressants can reduce suffering and can improve the ability to function during the depressed period. Therapy can offer emotional support and help you understand emotional factors that may be causing the depression.  Home care    Ongoing care and support help people manage this disease. Find a healthcare provider and therapist who meet your needs. Seek help when you feel like you may be getting ill.    Be kind to yourself. Make it a point to do things that you enjoy (gardening, walking in nature, going to a movie). Reward yourself for small successes.    Take care of your physical body. Eat a balanced diet (low in saturated fat and high in fruits and vegetables). Exercise at least 3 times a week for 30 minutes. Even mild-moderate exercise (like brisk walking) can make you feel better.    Don't drink alcohol,  which can make depression worse.    Take medicine as prescribed.    Tell each of your healthcare providers about all of the prescription and over-the-counter medicines, vitamins, and supplements you take. Certain supplements interact with medicines and can result in dangerous side effects. Ask your pharmacist when you have questions about medicine interactions.    Talk with your family and trusted friends about your feelings and thoughts. Ask them to help you recognize behavior changes early so you can get help and, if needed, medicine can be adjusted.    Follow-up care  Follow up with your healthcare provider, or as advised.  Call 911  Call 911 if you:    Have suicidal thoughts, a suicide plan, and the means to carry out the plan; or serious thoughts of hurting someone else     Have trouble breathing    Are very confused    Feel very drowsy or have trouble awakening    Faint or lose consciousness    Have new chest pain that becomes more severe, lasts longer, or spreads into your shoulder, arm, neck, jaw, or back  When to seek medical advice  Call your healthcare provider right away if any of these happen:    Feeling extreme depression, fear, anxiety, or anger toward yourself or others    Feeling out of control    Feeling that you may try to harm yourself or another    Hearing voices that others do not hear    Seeing things that others do not see    Can t sleep or eat for 3 days in a row    Friends or family express concern over your behavior and ask you to seek help  [employer]Well last reviewed this educational content on 10/1/2017    1132-9155 [employer]. [address] [place] [address], [place] [address]. All rights reserved. This information is not intended as a substitute for professional medical care. Always follow your healthcare professional's instructions.

## 2020-11-19 ENCOUNTER — TELEPHONE (OUTPATIENT)
Dept: BEHAVIORAL HEALTH | Facility: CLINIC | Age: 35
End: 2020-11-19

## 2020-11-19 ASSESSMENT — ANXIETY QUESTIONNAIRES: GAD7 TOTAL SCORE: 11

## 2020-12-27 ENCOUNTER — HEALTH MAINTENANCE LETTER (OUTPATIENT)
Age: 35
End: 2020-12-27

## 2020-12-31 ENCOUNTER — VIRTUAL VISIT (OUTPATIENT)
Dept: FAMILY MEDICINE | Facility: CLINIC | Age: 35
End: 2020-12-31
Payer: COMMERCIAL

## 2020-12-31 DIAGNOSIS — F33.0 MILD RECURRENT MAJOR DEPRESSION (H): Primary | ICD-10-CM

## 2020-12-31 PROCEDURE — 99213 OFFICE O/P EST LOW 20 MIN: CPT | Mod: 95 | Performed by: NURSE PRACTITIONER

## 2020-12-31 ASSESSMENT — ANXIETY QUESTIONNAIRES
IF YOU CHECKED OFF ANY PROBLEMS ON THIS QUESTIONNAIRE, HOW DIFFICULT HAVE THESE PROBLEMS MADE IT FOR YOU TO DO YOUR WORK, TAKE CARE OF THINGS AT HOME, OR GET ALONG WITH OTHER PEOPLE: SOMEWHAT DIFFICULT
5. BEING SO RESTLESS THAT IT IS HARD TO SIT STILL: SEVERAL DAYS
6. BECOMING EASILY ANNOYED OR IRRITABLE: NEARLY EVERY DAY
GAD7 TOTAL SCORE: 9
3. WORRYING TOO MUCH ABOUT DIFFERENT THINGS: SEVERAL DAYS
1. FEELING NERVOUS, ANXIOUS, OR ON EDGE: SEVERAL DAYS
7. FEELING AFRAID AS IF SOMETHING AWFUL MIGHT HAPPEN: NOT AT ALL
2. NOT BEING ABLE TO STOP OR CONTROL WORRYING: SEVERAL DAYS

## 2020-12-31 ASSESSMENT — PATIENT HEALTH QUESTIONNAIRE - PHQ9
SUM OF ALL RESPONSES TO PHQ QUESTIONS 1-9: 16
5. POOR APPETITE OR OVEREATING: MORE THAN HALF THE DAYS

## 2020-12-31 NOTE — PROGRESS NOTES
"Concepción Agustin is a 35 year old female who is being evaluated via a billable video visit.      The patient has been notified of following:     \"This video visit will be conducted via a call between you and your physician/provider. We have found that certain health care needs can be provided without the need for an in-person physical exam.  This service lets us provide the care you need with a video conversation.  If a prescription is necessary we can send it directly to your pharmacy.  If lab work is needed we can place an order for that and you can then stop by our lab to have the test done at a later time.    Video visits are billed at different rates depending on your insurance coverage.  Please reach out to your insurance provider with any questions.    If during the course of the call the physician/provider feels a video visit is not appropriate, you will not be charged for this service.\"    Patient has given verbal consent for Video visit? Yes  How would you like to obtain your AVS? MyChart  If you are dropped from the video visit, the video invite should be resent to: Text to cell phone: [phone]  Will anyone else be joining your video visit? No    Future Appointments   Date Time Provider Department Center   12/31/2020  1:00 PM Haase, Susan Rachele, ANGELA CNP [facility]     Appointment Notes for this encounter:    reviewed follow up-depression, PHQ 9, OREN 7 ph:[phone]    Health Maintenance Due   Topic Date Due     ANNUAL REVIEW OF HM ORDERS  1985     HIV SCREENING  03/19/2000     HEPATITIS C SCREENING  03/19/2003     DTAP/TDAP/TD IMMUNIZATION (1 - Tdap) 03/19/2010     PREVENTIVE CARE VISIT  02/19/2019     INFLUENZA VACCINE (1) 09/01/2020     HPV TEST  02/19/2021     PAP  02/19/2021     Health Maintenance addressed:  Pap Smear and Immunizations    Pap Smear Pt agrees to schedule appointment today or future appointment scheduled and Immunizations Pt will update today    MyChart Status:  Active and " Using  Text link to pt my chart didn't work last time    Subjective     Concepción Agustin is a 35 year old female who presents today via video visit for the following health issues:    HPI     Depression and Anxiety Follow-Up    How are you doing with your depression since your last visit? No change    How are you doing with your anxiety since your last visit?  No change    Are you having other symptoms that might be associated with depression or anxiety? No    Have you had a significant life event? No     Do you have any concerns with your use of alcohol or other drugs? No    Social History     Tobacco Use     Smoking status: Never Smoker     Smokeless tobacco: Never Used   Substance Use Topics     Alcohol use: No     Drug use: No     PHQ 2/19/2018 11/18/2020   PHQ-9 Total Score 14 18   Q9: Thoughts of better off dead/self-harm past 2 weeks Several days Several days     OREN-7 SCORE 11/18/2020   Total Score 11     Last PHQ-9 11/18/2020   1.  Little interest or pleasure in doing things 2   2.  Feeling down, depressed, or hopeless 1   3.  Trouble falling or staying asleep, or sleeping too much 3   4.  Feeling tired or having little energy 3   5.  Poor appetite or overeating 2   6.  Feeling bad about yourself 2   7.  Trouble concentrating 2   8.  Moving slowly or restless 2   Q9: Thoughts of better off dead/self-harm past 2 weeks 1   PHQ-9 Total Score 18   Difficulty at work, home, or with people Somewhat difficult     OREN-7  11/18/2020   1. Feeling nervous, anxious, or on edge 1   2. Not being able to stop or control worrying 1   3. Worrying too much about different things 3   4. Trouble relaxing 2   5. Being so restless that it is hard to sit still 1   6. Becoming easily annoyed or irritable 3   7. Feeling afraid, as if something awful might happen 0   OREN-7 Total Score 11   If you checked any problems, how difficult have they made it for you to do your work, take care of things at home, or get along with other people?  VERENICE PEDROZAOUGHLIN  [id number]    Interval History: The patient reports fatigue today.       Tele: atrial fibrillation in 110s       Current Meds:   ascorbic acid 500 milliGRAM(s) Oral daily  aspirin  chewable 81 milliGRAM(s) Oral daily  atorvastatin 20 milliGRAM(s) Oral at bedtime  benzocaine 15 mG/menthol 3.6 mG (Sugar-Free) Lozenge 1 Lozenge Oral every 4 hours PRN  cholecalciferol 1000 Unit(s) Oral daily  doxycycline hyclate Capsule 100 milliGRAM(s) Oral every 12 hours  folic acid 1 milliGRAM(s) Oral daily  furosemide   Injectable 40 milliGRAM(s) IV Push daily  heparin  Injectable 5000 Unit(s) SubCutaneous every 12 hours  influenza   Vaccine 0.5 milliLiter(s) IntraMuscular once  levETIRAcetam 500 milliGRAM(s) Oral two times a day  magnesium hydroxide Suspension 30 milliLiter(s) Oral daily PRN  metoprolol tartrate 25 milliGRAM(s) Oral every 8 hours  potassium chloride    Tablet ER 10 milliEquivalent(s) Oral daily  saccharomyces boulardii 250 milliGRAM(s) Oral two times a day  senna 2 Tablet(s) Oral at bedtime      Objective:     Vital Signs:   T(C): 36.5 (12-19-19 @ 10:44), Max: 36.6 (12-19-19 @ 06:16)  HR: 111 (12-19-19 @ 10:44) (74 - 113)  BP: 99/68 (12-19-19 @ 10:44) (94/68 - 116/61)  RR: 18 (12-19-19 @ 10:44) (18 - 18)  SpO2: 96% (12-19-19 @ 10:44) (91% - 96%)  Wt(kg): --      Physical Exam:   General: elderly woman, sitting in bed, no distress  Neck: supple  CVS: JVP ~ 9 cm H20, irregularly irregular, s1, s2, no murmurs   Pulm: unlabored respirations, crackles present  Ext: trace lower extremity edema b/l   Neuro: A&O x3  Psych: Normal affect        Labs:   19 Dec 2019 06:30    136    |  91     |  24.0   ----------------------------<  90     4.5     |  33.0   |  0.98     Ca    9.3        19 Dec 2019 06:30                            15.5   9.48  )-----------( 342      ( 19 Dec 2019 06:30 )             48.4         ECG (12/10/19): atrial fibrillation with RVR at 122 BPM, old inferior infarct (appears similar to old outpatient ECG)    TTE (12/13/19):  Summary:   1. Severely enlarged left atrium.   2. Left ventricular ejection fraction, by visual estimation, is 70 to   75%.   3. Severely enlarged right atrium.   4. The right ventricular size is mildly enlarged. RV systolic function   is normal.   5. Moderate mitral valve regurgitation. Mild mitral stenosis.   6. Bioprosthetic aortic valve. Likely Medtronic JOSE. well seated valve.   No leak. Acceptable gradients.   7. Moderate-severe tricuspid regurgitation.   8. There is no evidence of pericardial effusion.    CXR (12/12/19):  FINDINGS: There is no significant change.  The lungs  show diffuse interstitial opacities in perihilar and LEFT lung   base of concerning for infectious pneumonia.  The  heart is enlarged in transverse diameter. No hilar mass. Trachea   midline. Status post TAVR procedure. Cardiac device wire leads are within right   atrium and right ventricle.   Visualized osseous structures are intact.  IMPRESSION:   No interval change.. "Somewhat difficult   last visit on 11/18 at that time started on fluoxetine, has been taking the 20 mg dose for about 2 weeks.   PHQ 9 score of 16, OREN 7 score of 9. Denies thoughts of harming self or others. Feels that depression has decreased a little bit and anxiety has improved.      Suicide Assessment Five-step Evaluation and Treatment (SAFE-T)    Video Start Time: 1249      Review of Systems   CONSTITUTIONAL: NEGATIVE for fever, chills, change in weight  RESP: NEGATIVE for significant cough or SOB  CV: NEGATIVE for chest pain, palpitations or peripheral edema  PSYCHIATRIC: see HPI      Objective           Vitals:  No vitals were obtained today due to virtual visit.    Physical Exam     GENERAL: Healthy, alert and no distress  RESP: No audible wheeze, cough, or visible cyanosis.  No visible retractions or increased work of breathing.    NEURO: Cranial nerves grossly intact.  Mentation and speech appropriate for age.  PSYCH: Mentation appears normal, affect normal/bright, judgement and insight intact, normal speech and appearance well-groomed.              Assessment & Plan     Mild recurrent major depression (H): PHQ 9 score of 16, OREN 7 score of 9. Denies thoughts of harming self or others. Has been on the 20 mg dose of fluoxetine for about 2 weeks, encouraged to stay on this dose for another 4-6 weeks if does not notice any further change/improvement in depression send a IMGuest message and will increase dosage to 40 mg per day.  Discussed self care etc.       - FLUoxetine (PROZAC) 20 MG capsule  Dispense: 30 capsule; Refill: 1       BMI:   Estimated body mass index is 45.36 kg/m  as calculated from the following:    Height as of 11/18/20: 1.575 m (5' 2\").    Weight as of 11/18/20: 112.5 kg (248 lb).        Return in about 4 months (around 4/30/2021) for Physical Exam. and follow up of depression.    Susan Haase, APRN [facility]      Video-Visit Details    Type of service:  " Video Visit    Video End Time:1305  Originating Location (pt. Location): Home    Distant Location (provider location):  [facility]     Platform used for Video Visit: TammiWell

## 2021-01-01 ASSESSMENT — ANXIETY QUESTIONNAIRES: GAD7 TOTAL SCORE: 9

## 2021-03-25 ENCOUNTER — DOCUMENTATION ONLY (OUTPATIENT)
Dept: FAMILY MEDICINE | Facility: CLINIC | Age: 36
End: 2021-03-25

## 2021-03-25 NOTE — PROGRESS NOTES
RN chart review     Per Haase, Susan Rachele list patient is to be listed as SB #2 and is currently SB #4     RN changed to SB #2 per pcp     Celi Huff, Registered Nurse, PAL (Patient Advocate Liason)   [facility]   [phone]

## 2021-04-25 ENCOUNTER — HEALTH MAINTENANCE LETTER (OUTPATIENT)
Age: 36
End: 2021-04-25

## 2021-06-01 ENCOUNTER — TELEPHONE (OUTPATIENT)
Dept: FAMILY MEDICINE | Facility: CLINIC | Age: 36
End: 2021-06-01

## 2021-06-01 NOTE — TELEPHONE ENCOUNTER
Patient Quality Outreach      Summary:    Patient has the following on her problem list/HM:   Depression / Dysthymia review    6 Month Remission: 4-8 month window range: 3/19/2021 - 7/17/2021       PHQ-9 SCORE 2/19/2018 11/18/2020 12/31/2020   PHQ-9 Total Score MyChart 14 (Moderate depression) - -   PHQ-9 Total Score 14 18 16       If PHQ-9 recheck is 5 or more, route to provider for next steps.    Patient is due/failing the following:   PHQ-9 Needed    Type of outreach:    Sent Lettuce Eatt message.    Questions for provider review:    None                                                                                                                                     Mona Schmitz       Chart routed to Care Team.

## 2021-09-10 DIAGNOSIS — F33.0 MILD RECURRENT MAJOR DEPRESSION (H): ICD-10-CM

## 2021-09-10 NOTE — LETTER
September 13, 2021      Concepción Agustin  [address]  [facility] [address]      Dear Concepción,    We recently received a call from your pharmacy requesting a refill of your medication.    A review of your chart indicates that an appointment is required with your provider.  Please call the clinic to schedule your appointment.    We have authorized one refill of your medication to allow time for you to schedule.   If you have a history of diabetes or high cholesterol, please come in fasting for the appointment. Fasting entails nothing to eat or drink 8 hours prior to your appointment; with the exception on water. You may take your medication the day of the appointment.    Thank you,      Susan Rachele Haase RN, CNP

## 2021-09-13 NOTE — TELEPHONE ENCOUNTER
Routing refill request to provider for review/approval because:  Labs out of range:    PHQ 2/19/2018 11/18/2020 12/31/2020   PHQ-9 Total Score 14 18 16   Q9: Thoughts of better off dead/self-harm past 2 weeks Several days Several days Not at all     Oneida June RN

## 2021-10-09 ENCOUNTER — HEALTH MAINTENANCE LETTER (OUTPATIENT)
Age: 36
End: 2021-10-09

## 2021-10-21 ENCOUNTER — TELEPHONE (OUTPATIENT)
Dept: FAMILY MEDICINE | Facility: CLINIC | Age: 36
End: 2021-10-21

## 2021-10-21 DIAGNOSIS — F33.0 MILD RECURRENT MAJOR DEPRESSION (H): ICD-10-CM

## 2021-10-21 NOTE — TELEPHONE ENCOUNTER
Patient called clinic looking to refill Fluoxetine prescription. See 10/16/2021 for refill denial. Face to face appointment scheduled for 12/2/2021, earliest PCP appointment available that worked with patient's schedule.     Routing to provider to inform, if okay to see extender to be seen sooner please advise.     Medication and pharmacy prepped.     Routing refill request to provider for review/approval because:  Tasha given x2 and patient did not follow up, please advise  Patient needs to be seen because it has been more than 1 year since last office visit.  Patient's upcoming appointment is more than 30 days out.     Maile Vasquez, CARLN, RN  [facility]

## 2021-10-22 NOTE — TELEPHONE ENCOUNTER
Pharmacy faxing again - insurance will only cover 90 day supply of fluoxetine.    Next 5 appointments (look out 90 days)    Dec 02, 2021  9:50 AM  (Arrive by 9:25 AM)  Adult Preventative Visit with Susan Rachele Haase, APRN CNP  [facility] ([facility] ) [address] [place] [address] [id number] [phone]          RT Jordan (R)  [facility]

## 2021-12-02 ENCOUNTER — OFFICE VISIT (OUTPATIENT)
Dept: FAMILY MEDICINE | Facility: CLINIC | Age: 36
End: 2021-12-02
Payer: COMMERCIAL

## 2021-12-02 VITALS
WEIGHT: 251.6 LBS | TEMPERATURE: 98.8 F | DIASTOLIC BLOOD PRESSURE: 84 MMHG | SYSTOLIC BLOOD PRESSURE: 122 MMHG | HEIGHT: 62 IN | HEART RATE: 85 BPM | OXYGEN SATURATION: 96 % | BODY MASS INDEX: 46.3 KG/M2

## 2021-12-02 DIAGNOSIS — F33.0 MILD RECURRENT MAJOR DEPRESSION (H): ICD-10-CM

## 2021-12-02 DIAGNOSIS — Z00.00 ROUTINE GENERAL MEDICAL EXAMINATION AT A HEALTH CARE FACILITY: Primary | ICD-10-CM

## 2021-12-02 DIAGNOSIS — Z12.4 CERVICAL CANCER SCREENING: ICD-10-CM

## 2021-12-02 DIAGNOSIS — N89.8 VAGINAL DISCHARGE: ICD-10-CM

## 2021-12-02 DIAGNOSIS — E66.01 MORBID OBESITY (H): ICD-10-CM

## 2021-12-02 DIAGNOSIS — Z30.41 ENCOUNTER FOR SURVEILLANCE OF CONTRACEPTIVE PILLS: ICD-10-CM

## 2021-12-02 LAB
CLUE CELLS: ABNORMAL
TRICHOMONAS, WET PREP: ABNORMAL
WBC'S/HIGH POWER FIELD, WET PREP: ABNORMAL
YEAST, WET PREP: ABNORMAL

## 2021-12-02 PROCEDURE — 99395 PREV VISIT EST AGE 18-39: CPT | Performed by: NURSE PRACTITIONER

## 2021-12-02 PROCEDURE — 87624 HPV HI-RISK TYP POOLED RSLT: CPT | Performed by: NURSE PRACTITIONER

## 2021-12-02 PROCEDURE — 99213 OFFICE O/P EST LOW 20 MIN: CPT | Mod: 25 | Performed by: NURSE PRACTITIONER

## 2021-12-02 PROCEDURE — 87210 SMEAR WET MOUNT SALINE/INK: CPT | Performed by: NURSE PRACTITIONER

## 2021-12-02 PROCEDURE — G0145 SCR C/V CYTO,THINLAYER,RESCR: HCPCS | Performed by: NURSE PRACTITIONER

## 2021-12-02 RX ORDER — FLUOXETINE 40 MG/1
40 CAPSULE ORAL DAILY
Qty: 90 CAPSULE | Refills: 1 | Status: SHIPPED | OUTPATIENT
Start: 2021-12-02 | End: 2022-06-01

## 2021-12-02 RX ORDER — ACETAMINOPHEN AND CODEINE PHOSPHATE 120; 12 MG/5ML; MG/5ML
0.35 SOLUTION ORAL DAILY
Qty: 84 TABLET | Refills: 3 | Status: SHIPPED | OUTPATIENT
Start: 2021-12-02 | End: 2022-11-17

## 2021-12-02 SDOH — ECONOMIC STABILITY: INCOME INSECURITY: IN THE LAST 12 MONTHS, WAS THERE A TIME WHEN YOU WERE NOT ABLE TO PAY THE MORTGAGE OR RENT ON TIME?: PATIENT REFUSED

## 2021-12-02 SDOH — HEALTH STABILITY: PHYSICAL HEALTH: ON AVERAGE, HOW MANY DAYS PER WEEK DO YOU ENGAGE IN MODERATE TO STRENUOUS EXERCISE (LIKE A BRISK WALK)?: 0 DAYS

## 2021-12-02 SDOH — HEALTH STABILITY: PHYSICAL HEALTH: ON AVERAGE, HOW MANY MINUTES DO YOU ENGAGE IN EXERCISE AT THIS LEVEL?: 0 MIN

## 2021-12-02 SDOH — ECONOMIC STABILITY: FOOD INSECURITY: WITHIN THE PAST 12 MONTHS, THE FOOD YOU BOUGHT JUST DIDN'T LAST AND YOU DIDN'T HAVE MONEY TO GET MORE.: NEVER TRUE

## 2021-12-02 SDOH — ECONOMIC STABILITY: FOOD INSECURITY: WITHIN THE PAST 12 MONTHS, YOU WORRIED THAT YOUR FOOD WOULD RUN OUT BEFORE YOU GOT MONEY TO BUY MORE.: SOMETIMES TRUE

## 2021-12-02 SDOH — ECONOMIC STABILITY: INCOME INSECURITY: HOW HARD IS IT FOR YOU TO PAY FOR THE VERY BASICS LIKE FOOD, HOUSING, MEDICAL CARE, AND HEATING?: SOMEWHAT HARD

## 2021-12-02 SDOH — ECONOMIC STABILITY: TRANSPORTATION INSECURITY
IN THE PAST 12 MONTHS, HAS THE LACK OF TRANSPORTATION KEPT YOU FROM MEDICAL APPOINTMENTS OR FROM GETTING MEDICATIONS?: NO

## 2021-12-02 SDOH — ECONOMIC STABILITY: TRANSPORTATION INSECURITY
IN THE PAST 12 MONTHS, HAS LACK OF TRANSPORTATION KEPT YOU FROM MEETINGS, WORK, OR FROM GETTING THINGS NEEDED FOR DAILY LIVING?: NO

## 2021-12-02 ASSESSMENT — ENCOUNTER SYMPTOMS: ARTHRALGIAS: 1

## 2021-12-02 ASSESSMENT — PATIENT HEALTH QUESTIONNAIRE - PHQ9
10. IF YOU CHECKED OFF ANY PROBLEMS, HOW DIFFICULT HAVE THESE PROBLEMS MADE IT FOR YOU TO DO YOUR WORK, TAKE CARE OF THINGS AT HOME, OR GET ALONG WITH OTHER PEOPLE: VERY DIFFICULT
SUM OF ALL RESPONSES TO PHQ QUESTIONS 1-9: 20
SUM OF ALL RESPONSES TO PHQ QUESTIONS 1-9: 20

## 2021-12-02 ASSESSMENT — SOCIAL DETERMINANTS OF HEALTH (SDOH)
IN A TYPICAL WEEK, HOW MANY TIMES DO YOU TALK ON THE PHONE WITH FAMILY, FRIENDS, OR NEIGHBORS?: TWICE A WEEK
HOW OFTEN DO YOU GET TOGETHER WITH FRIENDS OR RELATIVES?: NEVER
HOW OFTEN DO YOU ATTEND CHURCH OR RELIGIOUS SERVICES?: NEVER
DO YOU BELONG TO ANY CLUBS OR ORGANIZATIONS SUCH AS CHURCH GROUPS UNIONS, FRATERNAL OR ATHLETIC GROUPS, OR SCHOOL GROUPS?: NO

## 2021-12-02 ASSESSMENT — ANXIETY QUESTIONNAIRES
7. FEELING AFRAID AS IF SOMETHING AWFUL MIGHT HAPPEN: SEVERAL DAYS
GAD7 TOTAL SCORE: 13
6. BECOMING EASILY ANNOYED OR IRRITABLE: NEARLY EVERY DAY
3. WORRYING TOO MUCH ABOUT DIFFERENT THINGS: MORE THAN HALF THE DAYS
2. NOT BEING ABLE TO STOP OR CONTROL WORRYING: MORE THAN HALF THE DAYS
4. TROUBLE RELAXING: MORE THAN HALF THE DAYS
1. FEELING NERVOUS, ANXIOUS, OR ON EDGE: MORE THAN HALF THE DAYS
7. FEELING AFRAID AS IF SOMETHING AWFUL MIGHT HAPPEN: SEVERAL DAYS
5. BEING SO RESTLESS THAT IT IS HARD TO SIT STILL: SEVERAL DAYS
8. IF YOU CHECKED OFF ANY PROBLEMS, HOW DIFFICULT HAVE THESE MADE IT FOR YOU TO DO YOUR WORK, TAKE CARE OF THINGS AT HOME, OR GET ALONG WITH OTHER PEOPLE?: VERY DIFFICULT

## 2021-12-02 ASSESSMENT — LIFESTYLE VARIABLES
HOW MANY STANDARD DRINKS CONTAINING ALCOHOL DO YOU HAVE ON A TYPICAL DAY: PATIENT DECLINED
HOW OFTEN DO YOU HAVE A DRINK CONTAINING ALCOHOL: NEVER
HOW OFTEN DO YOU HAVE SIX OR MORE DRINKS ON ONE OCCASION: NEVER

## 2021-12-02 ASSESSMENT — MIFFLIN-ST. JEOR: SCORE: 1784.5

## 2021-12-02 NOTE — PROGRESS NOTES
SUBJECTIVE:   CC: Concepción Agustin is an 36 year old woman who presents for preventive health visit.     Patient has been advised of split billing requirements and indicates understanding: Yes  Healthy Habits:     Getting at least 3 servings of Calcium per day:  NO    Bi-annual eye exam:  NO    Dental care twice a year:  NO    Sleep apnea or symptoms of sleep apnea:  None    Diet:  Regular (no restrictions)    Frequency of exercise:  None    Taking medications regularly:  Yes    Medication side effects:  None    PHQ-2 Total Score: 4    Additional concerns today:  Yes  Cervical cancer screening:  Last Pap 2/2018  Menses:  Monthly, on micronor, periods are light.   Depression:  PHQ 9 of 20.  OREN 7 of 13. Is on fluoxetine 20 mg daily.  Has thoughts that she is overwhelmed, would never harm herself due to her children.   Social:  Mother of 4 children, 4 year old boys are identical twins.       Today's PHQ-2 Score:   PHQ-2 ( 1999 Pfizer) 12/2/2021   Q1: Little interest or pleasure in doing things 2   Q2: Feeling down, depressed or hopeless 2   PHQ-2 Score 4   PHQ-2 Total Score (12-17 Years)- Positive if 3 or more points; Administer PHQ-A if positive -   Q1: Little interest or pleasure in doing things More than half the days   Q2: Feeling down, depressed or hopeless More than half the days   PHQ-2 Score 4     Answers for HPI/ROS submitted by the patient on 12/2/2021  If you checked off any problems, how difficult have these problems made it for you to do your work, take care of things at home, or get along with other people?: Very difficult  PHQ9 TOTAL SCORE: 20  OREN 7 TOTAL SCORE: 13      Abuse: Current or Past (Physical, Sexual or Emotional) - No  Do you feel safe in your environment? Yes        Social History     Tobacco Use     Smoking status: Never Smoker     Smokeless tobacco: Never Used   Substance Use Topics     Alcohol use: No         Alcohol Use 12/2/2021   Prescreen: >3 drinks/day or >7 drinks/week? No    Prescreen: >3 drinks/day or >7 drinks/week? -       Reviewed orders with patient.  Reviewed health maintenance and updated orders accordingly - Yes  Lab work is in process  BP Readings from Last 3 Encounters:   12/02/21 122/84   02/26/18 118/72   02/19/18 118/78    Wt Readings from Last 3 Encounters:   12/02/21 114.1 kg (251 lb 9.6 oz)   11/18/20 112.5 kg (248 lb)   02/26/18 96.2 kg (212 lb)                  Patient Active Problem List   Diagnosis     Mild recurrent major depression (H)     Encounter for surveillance of contraceptive pills     Obesity     No past surgical history on file.    Social History     Tobacco Use     Smoking status: Never Smoker     Smokeless tobacco: Never Used   Substance Use Topics     Alcohol use: No     Family History   Problem Relation Age of Onset     Thyroid Disease Mother      Coronary Artery Disease Paternal Grandfather          Current Outpatient Medications   Medication Sig Dispense Refill     FLUoxetine (PROZAC) 40 MG capsule Take 1 capsule (40 mg) by mouth daily TAKE 1 CAPSULE BY MOUTH EVERY DAY 90 capsule 1     norethindrone (MICRONOR) 0.35 MG tablet Take 1 tablet (0.35 mg) by mouth daily 84 tablet 3     clotrimazole (LOTRIMIN) 1 % cream Apply topically 2 times daily         Breast Cancer Screening:  Any new diagnosis of family breast, ovarian, or bowel cancer? No    FHS-7: No flowsheet data found.    Patient under 40 years of age: Routine Mammogram Screening not recommended.   Pertinent mammograms are reviewed under the imaging tab.    History of abnormal Pap smear: NO - age 30- 65 PAP every 3 years recommended  PAP / HPV Latest Ref Rng & Units 2/19/2018   PAP (Historical) - NIL   HPV16 NEG:Negative Negative   HPV18 NEG:Negative Negative   HRHPV NEG:Negative Negative     Reviewed and updated as needed this visit by clinical staff  Tobacco  Allergies      Fam Hx  Soc Hx       Reviewed and updated as needed this visit by Provider               Review of Systems  "  Cardiovascular: Positive for chest pain.   Musculoskeletal: Positive for arthralgias.   Chest pain: present when anxious, denies associated dizziness, nausea, palpitations.    OBJECTIVE:   /84 (BP Location: Right arm, Patient Position: Sitting, Cuff Size: Adult Regular)   Pulse 85   Temp 98.8  F (37.1  C) (Oral)   Ht 1.575 m (5' 2\")   Wt 114.1 kg (251 lb 9.6 oz)   SpO2 96%   BMI 46.02 kg/m    Physical Exam  GENERAL: healthy, alert and no distress  EYES: Eyes grossly normal to inspection, PERRL and conjunctivae and sclerae normal  HENT: ear canals and TM's normal, nose and mouth without ulcers or lesions  NECK: no adenopathy, no asymmetry, masses, or scars and thyroid normal to palpation  RESP: lungs clear to auscultation - no rales, rhonchi or wheezes  BREAST: normal without masses, tenderness or nipple discharge and no palpable axillary masses or adenopathy  CV: regular rate and rhythm, normal S1 S2, no S3 or S4, no murmur, click or rub, no peripheral edema and peripheral pulses strong  ABDOMEN: soft, nontender, no hepatosplenomegaly, no masses and bowel sounds normal   (female): normal female external genitalia, normal urethral meatus, vaginal mucosa pink, moist, well rugated, and normal cervix/adnexa/uterus without masses or discharge  MS: no gross musculoskeletal defects noted, no edema  SKIN: no suspicious lesions or rashes  NEURO: Normal strength and tone, mentation intact and speech normal  PSYCH: mentation appears normal, affect normal/bright    ASSESSMENT/PLAN:   Concepción was seen today for physical.    Diagnoses and all orders for this visit:    Routine general medical examination at a health care facility  -     CBC with Platelets & Differential; Future  -     Comprehensive metabolic panel; Future  -     Lipid panel reflex to direct LDL Fasting; Future  -     TSH with free T4 reflex; Future  -     Vitamin D Deficiency; Future    Mild recurrent major depression (H):   PHQ 9 of 20.  OREN 7 of 13. " "Will increase fluoxetine to 40 mg.  Is also interested in counseling (virtual) due to her busy schedule.  Will place a referral for [facility].  -     FLUoxetine (PROZAC) 40 MG capsule; Take 1 capsule (40 mg) by mouth daily TAKE 1 CAPSULE BY MOUTH EVERY DAY    Encounter for surveillance of contraceptive pills  -     norethindrone (MICRONOR) 0.35 MG tablet; Take 1 tablet (0.35 mg) by mouth daily    Cervical cancer screening  -     Pap Screen with HPV - recommended age 30 - 65 years    Vaginal discharge; wet prep pending.  -     Wet preparation    Morbid obesity (H):  Discussed increase in exercise    Other orders  -     REVIEW OF HEALTH MAINTENANCE PROTOCOL ORDERS  -     Cancel: TDAP VACCINE (Adacel, Boostrix)  [[id number]]      Patient has been advised of split billing requirements and indicates understanding:   COUNSELING:  Reviewed preventive health counseling, as reflected in patient instructions       Regular exercise       Healthy diet/nutrition       Contraception    Estimated body mass index is 46.02 kg/m  as calculated from the following:    Height as of this encounter: 1.575 m (5' 2\").    Weight as of this encounter: 114.1 kg (251 lb 9.6 oz).    Weight management plan: Discussed healthy diet and exercise guidelines    She reports that she has never smoked. She has never used smokeless tobacco.      Counseling Resources:  ATP IV Guidelines  Pooled Cohorts Equation Calculator  Breast Cancer Risk Calculator  BRCA-Related Cancer Risk Assessment: FHS-7 Tool  FRAX Risk Assessment  ICSI Preventive Guidelines  Dietary Guidelines for Americans, 2010  USDA's MyPlate  ASA Prophylaxis  Lung CA Screening  Follow up in 6 weeks for depression, virtual visit.  Susan Haase, APRN [facility]"

## 2021-12-02 NOTE — Clinical Note
Salvatore Cannon,  Can you reach out to Concepción?  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 interested in virtual counseling.  Thanks,  Susan Haase, CNP

## 2021-12-03 ASSESSMENT — ANXIETY QUESTIONNAIRES: GAD7 TOTAL SCORE: 13

## 2021-12-03 ASSESSMENT — PATIENT HEALTH QUESTIONNAIRE - PHQ9: SUM OF ALL RESPONSES TO PHQ QUESTIONS 1-9: 20

## 2021-12-06 LAB
BKR LAB AP GYN ADEQUACY: NORMAL
BKR LAB AP GYN INTERPRETATION: NORMAL
BKR LAB AP HPV REFLEX: NORMAL
BKR LAB AP LMP: NORMAL
BKR LAB AP PREVIOUS ABNORMAL: NORMAL
PATH REPORT.COMMENTS IMP SPEC: NORMAL
PATH REPORT.COMMENTS IMP SPEC: NORMAL
PATH REPORT.RELEVANT HX SPEC: NORMAL

## 2021-12-08 LAB
HUMAN PAPILLOMA VIRUS 16 DNA: NEGATIVE
HUMAN PAPILLOMA VIRUS 18 DNA: NEGATIVE
HUMAN PAPILLOMA VIRUS FINAL DIAGNOSIS: NORMAL
HUMAN PAPILLOMA VIRUS OTHER HR: NEGATIVE

## 2021-12-14 ENCOUNTER — LAB (OUTPATIENT)
Dept: LAB | Facility: CLINIC | Age: 36
End: 2021-12-14
Payer: COMMERCIAL

## 2021-12-14 DIAGNOSIS — Z00.00 ROUTINE GENERAL MEDICAL EXAMINATION AT A HEALTH CARE FACILITY: ICD-10-CM

## 2021-12-14 LAB
ALBUMIN SERPL-MCNC: 3.5 G/DL (ref 3.4–5)
ALP SERPL-CCNC: 80 U/L (ref 40–150)
ALT SERPL W P-5'-P-CCNC: 29 U/L (ref 0–50)
ANION GAP SERPL CALCULATED.3IONS-SCNC: 7 MMOL/L (ref 3–14)
AST SERPL W P-5'-P-CCNC: 17 U/L (ref 0–45)
BASOPHILS # BLD AUTO: 0 10E3/UL (ref 0–0.2)
BASOPHILS NFR BLD AUTO: 1 %
BILIRUB SERPL-MCNC: 0.5 MG/DL (ref 0.2–1.3)
BUN SERPL-MCNC: 12 MG/DL (ref 7–30)
CALCIUM SERPL-MCNC: 8.9 MG/DL (ref 8.5–10.1)
CHLORIDE BLD-SCNC: 107 MMOL/L (ref 94–109)
CO2 SERPL-SCNC: 23 MMOL/L (ref 20–32)
CREAT SERPL-MCNC: 0.83 MG/DL (ref 0.52–1.04)
DEPRECATED CALCIDIOL+CALCIFEROL SERPL-MC: 35 UG/L (ref 20–75)
EOSINOPHIL # BLD AUTO: 0.2 10E3/UL (ref 0–0.7)
EOSINOPHIL NFR BLD AUTO: 3 %
ERYTHROCYTE [DISTWIDTH] IN BLOOD BY AUTOMATED COUNT: 13.5 % (ref 10–15)
GFR SERPL CREATININE-BSD FRML MDRD: >90 ML/MIN/1.73M2
GLUCOSE BLD-MCNC: 99 MG/DL (ref 70–99)
HCT VFR BLD AUTO: 38.7 % (ref 35–47)
HGB BLD-MCNC: 12.8 G/DL (ref 11.7–15.7)
LYMPHOCYTES # BLD AUTO: 2.4 10E3/UL (ref 0.8–5.3)
LYMPHOCYTES NFR BLD AUTO: 34 %
MCH RBC QN AUTO: 27.4 PG (ref 26.5–33)
MCHC RBC AUTO-ENTMCNC: 33.1 G/DL (ref 31.5–36.5)
MCV RBC AUTO: 83 FL (ref 78–100)
MONOCYTES # BLD AUTO: 0.4 10E3/UL (ref 0–1.3)
MONOCYTES NFR BLD AUTO: 5 %
NEUTROPHILS # BLD AUTO: 4.2 10E3/UL (ref 1.6–8.3)
NEUTROPHILS NFR BLD AUTO: 58 %
PLATELET # BLD AUTO: 262 10E3/UL (ref 150–450)
POTASSIUM BLD-SCNC: 3.8 MMOL/L (ref 3.4–5.3)
PROT SERPL-MCNC: 7.4 G/DL (ref 6.8–8.8)
RBC # BLD AUTO: 4.68 10E6/UL (ref 3.8–5.2)
SODIUM SERPL-SCNC: 137 MMOL/L (ref 133–144)
TSH SERPL DL<=0.005 MIU/L-ACNC: 2.5 MU/L (ref 0.4–4)
WBC # BLD AUTO: 7.2 10E3/UL (ref 4–11)

## 2021-12-14 PROCEDURE — 36415 COLL VENOUS BLD VENIPUNCTURE: CPT

## 2021-12-14 PROCEDURE — 80050 GENERAL HEALTH PANEL: CPT

## 2021-12-14 PROCEDURE — 82306 VITAMIN D 25 HYDROXY: CPT

## 2021-12-14 PROCEDURE — 80061 LIPID PANEL: CPT

## 2021-12-17 LAB
CHOLEST SERPL-MCNC: 170 MG/DL
FASTING STATUS PATIENT QL REPORTED: YES
HDLC SERPL-MCNC: 33 MG/DL
LDLC SERPL CALC-MCNC: 94 MG/DL
NONHDLC SERPL-MCNC: 137 MG/DL
TRIGL SERPL-MCNC: 214 MG/DL

## 2021-12-23 ENCOUNTER — TELEPHONE (OUTPATIENT)
Dept: BEHAVIORAL HEALTH | Facility: CLINIC | Age: 36
End: 2021-12-23
Payer: COMMERCIAL

## 2021-12-23 NOTE — TELEPHONE ENCOUNTER
Writer called pt, per PCP request.  Left vmail with number for Behavioral Access Intake line at [phone] if she is interested in scheduling with writer in the future.    CHING Correia, [facility]  Behavioral Health Clinician

## 2022-01-10 ENCOUNTER — VIRTUAL VISIT (OUTPATIENT)
Dept: FAMILY MEDICINE | Facility: CLINIC | Age: 37
End: 2022-01-10
Payer: COMMERCIAL

## 2022-01-10 DIAGNOSIS — F33.0 MILD RECURRENT MAJOR DEPRESSION (H): Primary | ICD-10-CM

## 2022-01-10 DIAGNOSIS — E66.01 MORBID OBESITY (H): ICD-10-CM

## 2022-01-10 PROCEDURE — 99213 OFFICE O/P EST LOW 20 MIN: CPT | Mod: 95 | Performed by: NURSE PRACTITIONER

## 2022-01-10 ASSESSMENT — PATIENT HEALTH QUESTIONNAIRE - PHQ9: SUM OF ALL RESPONSES TO PHQ QUESTIONS 1-9: 11

## 2022-01-10 NOTE — PROGRESS NOTES
Concepción is a 36 year old who is being evaluated via a billable video visit.      How would you like to obtain your AVS? MyChart  If the video visit is dropped, the invitation should be resent by: Text to cell phone: [phone]  Will anyone else be joining your video visit? No  Video Start Time: 1515    Assessment & Plan     Mild recurrent major depression (H)  PHQ 9 score of 11.  OREN 7 of 13.  Depression improved PHQ 9 was 20 at last visit, fluoxetine was increased from 20 mg to 40 mg.  Denies thoughts of harming self or others.   Continue fluoxetine 40 mg every day.  Was given number for the counselor that reached out to her.      Morbid obesity (H): is aware of diet and exercise.      Return in about 6 months (around 7/10/2022) for Routine Visit.    Susan Haase, APRN [facility]   Concepción is a 36 year old who presents for the following health issues   HPI     Depression Followup    How are you doing with your depression since your last visit? Improved when she remember to takes her medication    Are you having other symptoms that might be associated with depression? No    Have you had a significant life event?  No     Are you feeling anxious or having panic attacks?   No    Do you have any concerns with your use of alcohol or other drugs? No    Social History     Tobacco Use     Smoking status: Never Smoker     Smokeless tobacco: Never Used   Vaping Use     Vaping Use: Never used   Substance Use Topics     Alcohol use: No     Drug use: No     PHQ 12/31/2020 10/28/2021 12/2/2021   PHQ-9 Total Score 16 20 20   Q9: Thoughts of better off dead/self-harm past 2 weeks Not at all More than half the days Several days   F/U: Thoughts of suicide or self-harm - Yes No   F/U: Self harm-plan - No -   F/U: Self-harm action - No -   F/U: Safety concerns - No No     OREN-7 SCORE 12/31/2020 10/28/2021 12/2/2021   Total Score - 17 (severe anxiety) 13 (moderate anxiety)   Total Score 9 17 13      Last PHQ-9 1/10/2022   1.  Little interest or pleasure in doing things 1   2.  Feeling down, depressed, or hopeless 1   3.  Trouble falling or staying asleep, or sleeping too much 3   4.  Feeling tired or having little energy 3   5.  Poor appetite or overeating 0   6.  Feeling bad about yourself 1   7.  Trouble concentrating 1   8.  Moving slowly or restless 1   Q9: Thoughts of better off dead/self-harm past 2 weeks 0   PHQ-9 Total Score 11   Difficulty at work, home, or with people Somewhat difficult   In the past two weeks have you had thoughts of suicide or self harm? -   Do you have concerns about your personal safety or the safety of others? -   In the past 2 weeks have you thought about a plan or had intention to harm yourself? -   In the past 2 weeks have you acted on these thoughts in any way? -     PHQ 9 score of 11.  OREN 7 of 13.  Depression improved PHQ 9 was 20 at last visit, fluoxetine was increased from 20 mg to 40 mg.  Denies thoughts of harming self or others.  Has not been in touch with a counselor.    Taking fluoxetine 40 mg daily, takes in the morning.      Review of Systems   CONSTITUTIONAL: NEGATIVE for fever, chills, change in weight  RESP: NEGATIVE for significant cough or SOB  CV: NEGATIVE for chest pain, palpitations or peripheral edema  PSYCHIATRIC: see      Objective           Vitals:  No vitals were obtained today due to virtual visit.    Physical Exam   GENERAL: Healthy, alert and no distress  RESP: No audible wheeze, cough, or visible cyanosis.  No visible retractions or increased work of breathing.    NEURO: Cranial nerves grossly intact.  Mentation and speech appropriate for age.  PSYCH: Mentation appears normal, affect normal/bright, judgement and insight intact, normal speech and appearance well-groomed.          Video-Visit Details    Type of service:  Video Visit    Video End Time:1321    Originating Location (pt. Location): Home    Distant Location (provider location):  M  [facility]     Platform used for Video Visit: Martina

## 2022-05-30 DIAGNOSIS — F33.0 MILD RECURRENT MAJOR DEPRESSION (H): ICD-10-CM

## 2022-06-01 RX ORDER — FLUOXETINE 40 MG/1
CAPSULE ORAL
Qty: 90 CAPSULE | Refills: 1 | Status: SHIPPED | OUTPATIENT
Start: 2022-06-01 | End: 2022-11-29

## 2022-06-01 NOTE — TELEPHONE ENCOUNTER
Routing refill request to provider for review/approval because: Elevated PHQ9  PHQ 10/28/2021 12/2/2021 1/10/2022   PHQ-9 Total Score 20 20 11   Q9: Thoughts of better off dead/self-harm past 2 weeks More than half the days Several days Not at all   F/U: Thoughts of suicide or self-harm Yes No -   F/U: Self harm-plan No - -   F/U: Self-harm action No - -   F/U: Safety concerns No No -         Gloria Smith RN on 6/1/2022 at 3:36 PM

## 2022-06-27 ENCOUNTER — LAB (OUTPATIENT)
Dept: URGENT CARE | Facility: URGENT CARE | Age: 37
End: 2022-06-27
Attending: PHYSICIAN ASSISTANT
Payer: COMMERCIAL

## 2022-06-27 ENCOUNTER — E-VISIT (OUTPATIENT)
Dept: FAMILY MEDICINE | Facility: CLINIC | Age: 37
End: 2022-06-27
Payer: COMMERCIAL

## 2022-06-27 DIAGNOSIS — J02.9 SORE THROAT: ICD-10-CM

## 2022-06-27 DIAGNOSIS — J02.9 SORE THROAT: Primary | ICD-10-CM

## 2022-06-27 LAB — DEPRECATED S PYO AG THROAT QL EIA: NEGATIVE

## 2022-06-27 PROCEDURE — 99421 OL DIG E/M SVC 5-10 MIN: CPT | Performed by: PHYSICIAN ASSISTANT

## 2022-06-27 PROCEDURE — 87651 STREP A DNA AMP PROBE: CPT

## 2022-06-27 NOTE — PATIENT INSTRUCTIONS
Thank you for choosing us for your care. Given your symptoms, I would like you to do a lab-only visit (strep testing) to determine what is causing them.  I have placed the orders.  Please schedule an appointment with the lab right here in [facility], or call [phone].  I will let you know when the results are back and next steps to take.

## 2022-06-28 LAB — GROUP A STREP BY PCR: NOT DETECTED

## 2022-09-17 ENCOUNTER — HEALTH MAINTENANCE LETTER (OUTPATIENT)
Age: 37
End: 2022-09-17

## 2022-11-17 DIAGNOSIS — Z30.41 ENCOUNTER FOR SURVEILLANCE OF CONTRACEPTIVE PILLS: ICD-10-CM

## 2022-11-17 RX ORDER — ACETAMINOPHEN AND CODEINE PHOSPHATE 120; 12 MG/5ML; MG/5ML
SOLUTION ORAL
Qty: 84 TABLET | Refills: 0 | Status: SHIPPED | OUTPATIENT
Start: 2022-11-17 | End: 2023-02-16

## 2022-11-17 NOTE — TELEPHONE ENCOUNTER
Prescription approved per [facility] Refill Protocol.    [place] refill provided, due for visit to establish care with new pcp as Haase, Susan Rachele APRN CNP has retired     My chart message sent to patient     Celi Huff, Registered Nurse  [facility]

## 2022-11-26 DIAGNOSIS — F33.0 MILD RECURRENT MAJOR DEPRESSION (H): ICD-10-CM

## 2022-11-28 NOTE — TELEPHONE ENCOUNTER
Routing refill request to provider for review/approval because:  Patient does not have a PHQ-9 score on file in the past 6 months.     PHQ 1/10/2022   PHQ-9 Total Score 11   Q9: Thoughts of better off dead/self-harm past 2 weeks Not at all   F/U: Thoughts of suicide or self-harm -   F/U: Self harm-plan -   F/U: Self-harm action -   F/U: Safety concerns -     Tasha SCHMIDT RN   Patient Advocate Liaison (PAL)  [facility]

## 2022-11-29 RX ORDER — FLUOXETINE 40 MG/1
CAPSULE ORAL
Qty: 90 CAPSULE | Refills: 1 | Status: SHIPPED | OUTPATIENT
Start: 2022-11-29 | End: 2023-03-21

## 2022-11-29 NOTE — TELEPHONE ENCOUNTER
LVM for pt to call back clinic- needs visit scheduled.    .Liana Cline MA on 11/29/2022 at 2:52 PM

## 2022-12-01 NOTE — TELEPHONE ENCOUNTER
Spoke to pt, msg given pt will go to website and fins a best fit doc and will call back to make an apt.Carole Santos MA

## 2023-01-23 ENCOUNTER — HEALTH MAINTENANCE LETTER (OUTPATIENT)
Age: 38
End: 2023-01-23

## 2023-02-15 DIAGNOSIS — Z30.41 ENCOUNTER FOR SURVEILLANCE OF CONTRACEPTIVE PILLS: ICD-10-CM

## 2023-02-15 NOTE — LETTER
February 27, 2023      Concepción Agustin  [id number]  [place] [facility] [address]      Dear Concepción,    We recently received a call from your pharmacy requesting a refill of your medication.    A review of your chart indicates that an appointment is required with your provider.  Please call the clinic to schedule your appointment.    We have authorized one refill of your medication to allow time for you to schedule.   If you have a history of diabetes or high cholesterol, please come in fasting for the appointment. Fasting entails nothing to eat or drink 8 hours prior to your appointment; with the exception on water. You may take your medication the day of the appointment.    Thank you,      [facility]

## 2023-02-15 NOTE — TELEPHONE ENCOUNTER
Routing refill request to provider for review/approval because:  Tasha given x1 and patient did not follow up, please advise  Estelle Lackey RN

## 2023-02-16 RX ORDER — ACETAMINOPHEN AND CODEINE PHOSPHATE 120; 12 MG/5ML; MG/5ML
SOLUTION ORAL
Qty: 84 TABLET | Refills: 0 | Status: SHIPPED | OUTPATIENT
Start: 2023-02-16 | End: 2023-03-21

## 2023-02-16 NOTE — TELEPHONE ENCOUNTER
Routing refill request to provider for review/approval because:  Tasha given x1 and patient did not follow up, please advise  Patient needs to be seen because it has been more than 1 year since last office visit.  See  note, can you advise one month refill? Send to TC tot ry and schedule?  Angie Caballero RN, BSN  [facility]

## 2023-02-16 NOTE — TELEPHONE ENCOUNTER
This is last refill until a visit. Patient was instructed she needs a visit in November and never scheduled on. Please help her get one scheduled with a new PCP.

## 2023-02-27 NOTE — TELEPHONE ENCOUNTER
LVM for pt to call clinic back-Needs to establish care with a new PCP. Also sent letter.   Joycelyn Matta MA

## 2023-03-21 ENCOUNTER — OFFICE VISIT (OUTPATIENT)
Dept: FAMILY MEDICINE | Facility: CLINIC | Age: 38
End: 2023-03-21
Payer: COMMERCIAL

## 2023-03-21 VITALS
OXYGEN SATURATION: 98 % | DIASTOLIC BLOOD PRESSURE: 90 MMHG | BODY MASS INDEX: 46.74 KG/M2 | HEIGHT: 62 IN | SYSTOLIC BLOOD PRESSURE: 128 MMHG | WEIGHT: 254 LBS | RESPIRATION RATE: 16 BRPM | HEART RATE: 75 BPM | TEMPERATURE: 98 F

## 2023-03-21 DIAGNOSIS — F33.0 MILD RECURRENT MAJOR DEPRESSION (H): ICD-10-CM

## 2023-03-21 DIAGNOSIS — E66.01 MORBID OBESITY (H): ICD-10-CM

## 2023-03-21 DIAGNOSIS — R03.0 ELEVATED BP WITHOUT DIAGNOSIS OF HYPERTENSION: ICD-10-CM

## 2023-03-21 DIAGNOSIS — N91.2 AMENORRHEA: ICD-10-CM

## 2023-03-21 DIAGNOSIS — Z00.00 ROUTINE GENERAL MEDICAL EXAMINATION AT A HEALTH CARE FACILITY: Primary | ICD-10-CM

## 2023-03-21 PROBLEM — Z30.41 ENCOUNTER FOR SURVEILLANCE OF CONTRACEPTIVE PILLS: Status: RESOLVED | Noted: 2018-02-19 | Resolved: 2023-03-21

## 2023-03-21 PROCEDURE — 99214 OFFICE O/P EST MOD 30 MIN: CPT | Mod: 25 | Performed by: NURSE PRACTITIONER

## 2023-03-21 PROCEDURE — 90471 IMMUNIZATION ADMIN: CPT | Performed by: NURSE PRACTITIONER

## 2023-03-21 PROCEDURE — 90715 TDAP VACCINE 7 YRS/> IM: CPT | Performed by: NURSE PRACTITIONER

## 2023-03-21 PROCEDURE — 99395 PREV VISIT EST AGE 18-39: CPT | Mod: 25 | Performed by: NURSE PRACTITIONER

## 2023-03-21 RX ORDER — FLUOXETINE 40 MG/1
40 CAPSULE ORAL DAILY
Qty: 90 CAPSULE | Refills: 3 | Status: SHIPPED | OUTPATIENT
Start: 2023-03-21 | End: 2023-08-14

## 2023-03-21 ASSESSMENT — ENCOUNTER SYMPTOMS
CONSTIPATION: 0
HEMATURIA: 0
HEARTBURN: 0
FREQUENCY: 0
HEADACHES: 1
DYSURIA: 0
DIARRHEA: 0
NERVOUS/ANXIOUS: 0
FEVER: 0
ARTHRALGIAS: 1
COUGH: 0
DIZZINESS: 0
JOINT SWELLING: 0
NAUSEA: 0
ABDOMINAL PAIN: 0
WEAKNESS: 0
EYE PAIN: 0
HEMATOCHEZIA: 0
MYALGIAS: 1
CHILLS: 0
SHORTNESS OF BREATH: 0
PALPITATIONS: 0
PARESTHESIAS: 0

## 2023-03-21 ASSESSMENT — PATIENT HEALTH QUESTIONNAIRE - PHQ9
SUM OF ALL RESPONSES TO PHQ QUESTIONS 1-9: 19
10. IF YOU CHECKED OFF ANY PROBLEMS, HOW DIFFICULT HAVE THESE PROBLEMS MADE IT FOR YOU TO DO YOUR WORK, TAKE CARE OF THINGS AT HOME, OR GET ALONG WITH OTHER PEOPLE: VERY DIFFICULT
SUM OF ALL RESPONSES TO PHQ QUESTIONS 1-9: 19

## 2023-03-21 NOTE — PROGRESS NOTES
Prior to immunization administration, verified patients identity using patient s name and date of birth. Please see Immunization Activity for additional information.     Screening Questionnaire for Adult Immunization    Are you sick today?   No   Do you have allergies to medications, food, a vaccine component or latex?   No   Have you ever had a serious reaction after receiving a vaccination?   No   Do you have a long-term health problem with heart, lung, kidney, or metabolic disease (e.g., diabetes), asthma, a blood disorder, no spleen, complement component deficiency, a cochlear implant, or a spinal fluid leak?  Are you on long-term aspirin therapy?   No   Do you have cancer, leukemia, HIV/AIDS, or any other immune system problem?   No   Do you have a parent, brother, or sister with an immune system problem?   No   In the past 3 months, have you taken medications that affect  your immune system, such as prednisone, other steroids, or anticancer drugs; drugs for the treatment of rheumatoid arthritis, Crohn s disease, or psoriasis; or have you had radiation treatments?   No   Have you had a seizure, or a brain or other nervous system problem?   No   During the past year, have you received a transfusion of blood or blood    products, or been given immune (gamma) globulin or antiviral drug?   No   For women: Are you pregnant or is there a chance you could become       pregnant during the next month?   No   Have you received any vaccinations in the past 4 weeks?   No     Immunization questionnaire answers were all negative.        Per orders of Haase, Susan Rachele, injection of Tdap given by Magnolia Collins CMA. Patient instructed to remain in clinic for 15 minutes afterwards, and to report any adverse reaction to me immediately.       Screening performed by Magnolia Collins CMA on 3/21/2023 at 10:45 AM.

## 2023-03-21 NOTE — PROGRESS NOTES
SUBJECTIVE:   CC: Concepción is an 38 year old who presents for preventive health visit.   Patient has been advised of split billing requirements and indicates understanding: Yes  Healthy Habits:     Getting at least 3 servings of Calcium per day:  NO    Bi-annual eye exam:  NO    Dental care twice a year:  NO    Sleep apnea or symptoms of sleep apnea:  Daytime drowsiness    Diet:  Regular (no restrictions)    Frequency of exercise:  1 day/week    Duration of exercise:  15-30 minutes    Taking medications regularly:  Yes    PHQ-2 Total Score: 4    Additional concerns today:  Yes  Last Pap 12/2021, NIL.    Irregular menses:  Previously on micronor, after stopping OCP had 2-3 periods, after that stopped getting periods, it has been at least 4 months since last period.  Has cramping/symptoms of a period, no bleeding.  Has not been sexually active for at least 1 year, no chance of pregnancy.    Depression:  PHQ 9 score of 19, has some thoughts of harming self, denies plan, reports she would never harm herself due to having 4 kids. Is taking fluoxetine 40 mg, does not take on a regular basis.  Is not in counseling, would be interested in attending virtual visits.    Elevated BP:  Denies history of HTN, BP today 128/90, denies chest pain, shortness of breath, headache.   Heart problems in father's side, father with heart attack.  Unsure of family history of HTN    Today's PHQ-2 Score:   PHQ-2 ( 1999 Pfizer) 3/21/2023   Q1: Little interest or pleasure in doing things 2   Q2: Feeling down, depressed or hopeless 2   PHQ-2 Score 4   PHQ-2 Total Score (12-17 Years)- Positive if 3 or more points; Administer PHQ-A if positive -   Q1: Little interest or pleasure in doing things More than half the days   Q2: Feeling down, depressed or hopeless More than half the days   PHQ-2 Score 4       Have you ever done Advance Care Planning? (For example, a Health Directive, POLST, or a discussion with a medical provider or your loved ones about  your wishes): No    Social History     Tobacco Use     Smoking status: Never     Smokeless tobacco: Never   Substance Use Topics     Alcohol use: No       Alcohol Use 3/21/2023   Prescreen: >3 drinks/day or >7 drinks/week? No       Reviewed orders with patient.  Reviewed health maintenance and updated orders accordingly - Yes  Labs reviewed in EPIC  BP Readings from Last 3 Encounters:   03/21/23 (!) 120/90   12/02/21 122/84   02/26/18 118/72    Wt Readings from Last 3 Encounters:   03/21/23 115.2 kg (254 lb)   12/02/21 114.1 kg (251 lb 9.6 oz)   11/18/20 112.5 kg (248 lb)           Patient Active Problem List   Diagnosis     Mild recurrent major depression (H)     Encounter for surveillance of contraceptive pills     Obesity     No past surgical history on file.    Social History     Tobacco Use     Smoking status: Never     Smokeless tobacco: Never   Substance Use Topics     Alcohol use: No     Family History   Problem Relation Age of Onset     Thyroid Disease Mother      Coronary Artery Disease Paternal Grandfather          Current Outpatient Medications   Medication Sig Dispense Refill     FLUoxetine (PROZAC) 40 MG capsule TAKE 1 CAPSULE BY MOUTH EVERY DAY 90 capsule 1     clotrimazole (LOTRIMIN) 1 % cream Apply topically 2 times daily         Breast Cancer Screening:    Breast CA Risk Assessment (FHS-7) 12/2/2021   Do you have a family history of breast, colon, or ovarian cancer? No / Unknown       Patient under 40 years of age: Routine Mammogram Screening not recommended.   Pertinent mammograms are reviewed under the imaging tab.    History of abnormal Pap smear: NO - age 30- 65 PAP every 3 years recommended  PAP / HPV Latest Ref Rng & Units 12/2/2021 2/19/2018   PAP   Negative for Intraepithelial Lesion or Malignancy (NILM) -   PAP (Historical) - - NIL   HPV16 Negative Negative Negative   HPV18 Negative Negative Negative   HRHPV Negative Negative Negative     Reviewed and updated as needed this visit by  "clinical staff   Tobacco  Allergies  Meds              Reviewed and updated as needed this visit by Provider                     Review of Systems   Constitutional: Negative for chills and fever.   HENT: Negative for congestion, ear pain and hearing loss.    Eyes: Negative for pain and visual disturbance.   Respiratory: Negative for cough and shortness of breath.    Cardiovascular: Negative for chest pain, palpitations and peripheral edema.   Gastrointestinal: Negative for abdominal pain, constipation, diarrhea, heartburn, hematochezia and nausea.   Genitourinary: Positive for genital sores. Negative for dysuria, frequency, hematuria and urgency.   Musculoskeletal: Positive for arthralgias and myalgias. Negative for joint swelling.   Skin: Negative for rash.   Neurological: Positive for headaches. Negative for dizziness, weakness and paresthesias.   Psychiatric/Behavioral: Positive for mood changes. The patient is not nervous/anxious.    Genital sore:  Does not have.    Myalgia:  Overall bodyaches.       OBJECTIVE:   BP (!) 120/90 (Cuff Size: Adult Large)   Pulse 75   Temp 98  F (36.7  C) (Oral)   Resp 16   Ht 1.568 m (5' 1.75\")   Wt 115.2 kg (254 lb)   SpO2 98%   BMI 46.83 kg/m    Physical Exam  GENERAL: healthy, alert and no distress  EYES: Eyes grossly normal to inspection  HENT: ear canals and TM's normal, nose and mouth without ulcers or lesions  NECK: no adenopathy, no asymmetry, masses, or scars and thyroid normal to palpation  RESP: lungs clear to auscultation - no rales, rhonchi or wheezes  BREAST: normal without masses, tenderness or nipple discharge and no palpable axillary masses or adenopathy  CV: regular rate and rhythm, normal S1 S2, no S3 or S4, no murmur, click or rub, no peripheral edema  ABDOMEN: soft, nontender, no hepatosplenomegaly, no masses and bowel sounds normal  MS: no gross musculoskeletal defects noted, no edema  SKIN: no suspicious lesions or rashes  NEURO: Normal strength and " tone, mentation intact and speech normal  PSYCH: mentation appears normal, affect normal/bright      ASSESSMENT/PLAN:   Concepción was seen today for physical.    Diagnoses and all orders for this visit:    Routine general medical examination at a health care facility  -     CBC with Platelets & Differential; Future  -     Comprehensive metabolic panel; Future  -     Lipid panel reflex to direct LDL Fasting; Future  -     Vitamin D Deficiency; Future  -     TDAP VACCINE (Adacel, Boostrix)    Amenorrhea:  Irregular periods for past year, last menses 5 months ago, not sexually active for 1 year.  Will obtain below lab testing.   -     TSH with free T4 reflex; Future  -     Follicle stimulating hormone; Future  -     Estradiol; Future  -     Prolactin; Future    Mild recurrent major depression (H): PHQ 9 of 19, has thoughts of self harm, denies plan, reports never harming herself due to her 4 children.  Is aware of emergency resources, is in agreement with therapy, referral placed.   -     FLUoxetine (PROZAC) 40 MG capsule; Take 1 capsule (40 mg) by mouth daily  -     Adult Mental Health  Referral; Future    Elevated BP without diagnosis of hypertension: will recheck in next week at lab only visit, also prescribed home BP cuff to check several times per week if >140/90 set up return appointment to start on antihypertensive.   -     Home Blood Pressure Monitor Order for DME - ONLY FOR DME    Obesity    Other orders  -     REVIEW OF HEALTH MAINTENANCE PROTOCOL ORDERS        Patient has been advised of split billing requirements and indicates understanding: Yes      COUNSELING:  Reviewed preventive health counseling, as reflected in patient instructions       Regular exercise       Healthy diet/nutrition        She reports that she has never smoked. She has never used smokeless tobacco.      Susan Haase, APRN [facility]  Answers for HPI/ROS submitted by the patient on 3/21/2023  If you  checked off any problems, how difficult have these problems made it for you to do your work, take care of things at home, or get along with other people?: Very difficult  PHQ9 TOTAL SCORE: 19

## 2023-03-28 ENCOUNTER — ALLIED HEALTH/NURSE VISIT (OUTPATIENT)
Dept: FAMILY MEDICINE | Facility: CLINIC | Age: 38
End: 2023-03-28
Payer: COMMERCIAL

## 2023-03-28 ENCOUNTER — LAB (OUTPATIENT)
Dept: LAB | Facility: CLINIC | Age: 38
End: 2023-03-28
Payer: COMMERCIAL

## 2023-03-28 VITALS — DIASTOLIC BLOOD PRESSURE: 84 MMHG | SYSTOLIC BLOOD PRESSURE: 137 MMHG

## 2023-03-28 DIAGNOSIS — Z00.00 ROUTINE GENERAL MEDICAL EXAMINATION AT A HEALTH CARE FACILITY: ICD-10-CM

## 2023-03-28 DIAGNOSIS — R03.0 ELEVATED BP WITHOUT DIAGNOSIS OF HYPERTENSION: Primary | ICD-10-CM

## 2023-03-28 DIAGNOSIS — N91.2 AMENORRHEA: ICD-10-CM

## 2023-03-28 LAB
ALBUMIN SERPL BCG-MCNC: 4.1 G/DL (ref 3.5–5.2)
ALP SERPL-CCNC: 107 U/L (ref 35–104)
ALT SERPL W P-5'-P-CCNC: 35 U/L (ref 10–35)
ANION GAP SERPL CALCULATED.3IONS-SCNC: 15 MMOL/L (ref 7–15)
AST SERPL W P-5'-P-CCNC: 36 U/L (ref 10–35)
BASOPHILS # BLD AUTO: 0 10E3/UL (ref 0–0.2)
BASOPHILS NFR BLD AUTO: 1 %
BILIRUB SERPL-MCNC: 0.4 MG/DL
BUN SERPL-MCNC: 10.9 MG/DL (ref 6–20)
CALCIUM SERPL-MCNC: 9.1 MG/DL (ref 8.6–10)
CHLORIDE SERPL-SCNC: 103 MMOL/L (ref 98–107)
CHOLEST SERPL-MCNC: 176 MG/DL
CREAT SERPL-MCNC: 0.85 MG/DL (ref 0.51–0.95)
DEPRECATED CALCIDIOL+CALCIFEROL SERPL-MC: 31 UG/L (ref 20–75)
DEPRECATED HCO3 PLAS-SCNC: 21 MMOL/L (ref 22–29)
EOSINOPHIL # BLD AUTO: 0.2 10E3/UL (ref 0–0.7)
EOSINOPHIL NFR BLD AUTO: 3 %
ERYTHROCYTE [DISTWIDTH] IN BLOOD BY AUTOMATED COUNT: 13.2 % (ref 10–15)
ESTRADIOL SERPL-MCNC: 53 PG/ML
FSH SERPL IRP2-ACNC: 6.5 MIU/ML
GFR SERPL CREATININE-BSD FRML MDRD: 89 ML/MIN/1.73M2
GLUCOSE SERPL-MCNC: 102 MG/DL (ref 70–99)
HCG SERPL QL: NEGATIVE
HCT VFR BLD AUTO: 38.6 % (ref 35–47)
HDLC SERPL-MCNC: 30 MG/DL
HGB BLD-MCNC: 12.9 G/DL (ref 11.7–15.7)
LDLC SERPL CALC-MCNC: 103 MG/DL
LYMPHOCYTES # BLD AUTO: 2.4 10E3/UL (ref 0.8–5.3)
LYMPHOCYTES NFR BLD AUTO: 32 %
MCH RBC QN AUTO: 28 PG (ref 26.5–33)
MCHC RBC AUTO-ENTMCNC: 33.4 G/DL (ref 31.5–36.5)
MCV RBC AUTO: 84 FL (ref 78–100)
MONOCYTES # BLD AUTO: 0.4 10E3/UL (ref 0–1.3)
MONOCYTES NFR BLD AUTO: 6 %
NEUTROPHILS # BLD AUTO: 4.3 10E3/UL (ref 1.6–8.3)
NEUTROPHILS NFR BLD AUTO: 59 %
NONHDLC SERPL-MCNC: 146 MG/DL
PLATELET # BLD AUTO: 233 10E3/UL (ref 150–450)
POTASSIUM SERPL-SCNC: 4.4 MMOL/L (ref 3.4–5.3)
PROLACTIN SERPL 3RD IS-MCNC: 13 NG/ML (ref 5–23)
PROT SERPL-MCNC: 7.3 G/DL (ref 6.4–8.3)
RBC # BLD AUTO: 4.6 10E6/UL (ref 3.8–5.2)
SODIUM SERPL-SCNC: 139 MMOL/L (ref 136–145)
TRIGL SERPL-MCNC: 216 MG/DL
TSH SERPL DL<=0.005 MIU/L-ACNC: 2.83 UIU/ML (ref 0.3–4.2)
WBC # BLD AUTO: 7.4 10E3/UL (ref 4–11)

## 2023-03-28 PROCEDURE — 80061 LIPID PANEL: CPT

## 2023-03-28 PROCEDURE — 84703 CHORIONIC GONADOTROPIN ASSAY: CPT

## 2023-03-28 PROCEDURE — 84146 ASSAY OF PROLACTIN: CPT

## 2023-03-28 PROCEDURE — 36415 COLL VENOUS BLD VENIPUNCTURE: CPT

## 2023-03-28 PROCEDURE — 80050 GENERAL HEALTH PANEL: CPT

## 2023-03-28 PROCEDURE — 82306 VITAMIN D 25 HYDROXY: CPT

## 2023-03-28 PROCEDURE — 82670 ASSAY OF TOTAL ESTRADIOL: CPT

## 2023-03-28 PROCEDURE — 99207 PR NO CHARGE NURSE ONLY: CPT

## 2023-03-28 PROCEDURE — 83001 ASSAY OF GONADOTROPIN (FSH): CPT

## 2023-03-28 NOTE — PROGRESS NOTES
BP Readings from Last 6 Encounters:   03/28/23 137/84   03/21/23 (!) 128/90   12/02/21 122/84   02/26/18 118/72   02/19/18 118/78   10/09/17 111/73

## 2023-03-30 DIAGNOSIS — Z30.41 ENCOUNTER FOR SURVEILLANCE OF CONTRACEPTIVE PILLS: ICD-10-CM

## 2023-03-30 RX ORDER — ACETAMINOPHEN AND CODEINE PHOSPHATE 120; 12 MG/5ML; MG/5ML
0.35 SOLUTION ORAL DAILY
Qty: 84 TABLET | Refills: 0 | Status: SHIPPED | OUTPATIENT
Start: 2023-03-30 | End: 2023-05-10

## 2023-03-30 NOTE — PROGRESS NOTES
Spoke with Concepción regarding normal lab results, nothing that would indicate a reason for irregular menses.  Did have spotting last week.  Will place back on OCP, has been on micronor in the past.  Will take for 3 months and see if periods regulate.  If she does not have a period after taking the micronor for 1 month will contact provider and will refer to gyn for further evaluation.  Also, if has periods after taking pills for 3 months and after stopping the pill again periods do not resume would suggest seeing gyn.  Concepción is in agreement with plan.   Susan Haase, CNP

## 2023-05-08 DIAGNOSIS — F33.0 MILD RECURRENT MAJOR DEPRESSION (H): ICD-10-CM

## 2023-05-08 DIAGNOSIS — Z30.41 ENCOUNTER FOR SURVEILLANCE OF CONTRACEPTIVE PILLS: ICD-10-CM

## 2023-05-10 RX ORDER — FLUOXETINE 40 MG/1
CAPSULE ORAL
Qty: 90 CAPSULE | Refills: 1 | OUTPATIENT
Start: 2023-05-10

## 2023-05-10 RX ORDER — ACETAMINOPHEN AND CODEINE PHOSPHATE 120; 12 MG/5ML; MG/5ML
SOLUTION ORAL
Qty: 84 TABLET | Refills: 3 | Status: SHIPPED | OUTPATIENT
Start: 2023-05-10

## 2023-05-10 NOTE — TELEPHONE ENCOUNTER
See  visit, sent refills x 1 year to another [facility], [facility]J not refilling now  Angie Caballero RN, BSN  [facility]

## 2023-08-11 DIAGNOSIS — F33.0 MILD RECURRENT MAJOR DEPRESSION (H): ICD-10-CM

## 2023-08-14 RX ORDER — FLUOXETINE 40 MG/1
40 CAPSULE ORAL DAILY
Qty: 90 CAPSULE | Refills: 2 | Status: SHIPPED | OUTPATIENT
Start: 2023-08-14

## 2023-08-14 NOTE — TELEPHONE ENCOUNTER
Routing refill request to provider for review/approval because:  Most recent phq9 is outside protocol parameters.    Please advise, thanks.

## 2024-05-04 ENCOUNTER — HEALTH MAINTENANCE LETTER (OUTPATIENT)
Age: 39
End: 2024-05-04

## 2024-08-05 ENCOUNTER — MYC MEDICAL ADVICE (OUTPATIENT)
Dept: FAMILY MEDICINE | Facility: CLINIC | Age: 39
End: 2024-08-05
Payer: COMMERCIAL

## 2024-08-05 NOTE — TELEPHONE ENCOUNTER
Patient Quality Outreach    Patient is due for the following:   Depression  -  PHQ-A needed    Next Steps:   No follow up needed at this time.    Type of outreach:    Sent [employer] message.    Next Steps:  Reach out within 90 days via [email or web address].    Max number of attempts reached: No. Will try again in 90 days if patient still on fail list.    Questions for provider review:    None           Sadia Barbosa CMA  Chart routed to Care Team.      
Patient tolerated procedure well.

## 2025-03-24 ENCOUNTER — NURSE TRIAGE (OUTPATIENT)
Dept: PEDIATRICS | Facility: CLINIC | Age: 40
End: 2025-03-24
Payer: COMMERCIAL

## 2025-03-24 NOTE — TELEPHONE ENCOUNTER
"Nurse Triage SBAR    Is this a 2nd Level Triage? NO    Situation: Patient fell on bottom stair Saturday and R foot over- \"twisted it upside down\"    Background:   Has been able to walk around but limping and \"cannot walk normally\"  Assessment:   Pain is low when resting/leg is up  Pain 8/10 with walking  Feels pain on bottom outside of foot   Has been elevating and icing- swelling has improved since yesterday  Has taken tylenol for pain    Protocol Recommended Disposition:   See in Office Today or Tomorrow    Recommendation: Provided care advice and scheduled patient for sooner appt. (Cancelled appt for Thursday)       Mona SNOW RN  [facility]          Reason for Disposition   Limp when walking   MODERATE pain (e.g., interferes with normal activities, limping) and high-risk adult (e.g., age > 60 years, osteoporosis, chronic steroid use)    Additional Information   Negative: Can't stand (bear weight) or walk   Negative: Skin is split open or gaping (or length > 1/2 inch or 12 mm)   Negative: Bleeding and won't stop after 10 minutes of direct pressure (using correct technique)   Negative: Dirt in the wound and not removed with 15 minutes of scrubbing   Negative: New numbness (loss of sensation) of foot or toe(s) and present now   Negative: Sounds like a serious injury to the triager   Negative: SEVERE pain and not improved 2 hours after pain medicine/ice packs   Negative: Large swelling or bruise (> 2 inches or 5 cm)   Negative: No prior tetanus shots (or is not fully vaccinated) and any wound (e.g., cut, scrape)   Negative: HIV positive or severe immunodeficiency (severely weak immune system) and DIRTY cut    Protocols used: Foot Injury-A-[place]    "

## 2025-03-25 ENCOUNTER — ANCILLARY PROCEDURE (OUTPATIENT)
Dept: GENERAL RADIOLOGY | Facility: CLINIC | Age: 40
End: 2025-03-25
Attending: PHYSICIAN ASSISTANT
Payer: COMMERCIAL

## 2025-03-25 ENCOUNTER — OFFICE VISIT (OUTPATIENT)
Dept: FAMILY MEDICINE | Facility: CLINIC | Age: 40
End: 2025-03-25
Payer: COMMERCIAL

## 2025-03-25 VITALS
DIASTOLIC BLOOD PRESSURE: 84 MMHG | OXYGEN SATURATION: 100 % | TEMPERATURE: 98 F | SYSTOLIC BLOOD PRESSURE: 124 MMHG | BODY MASS INDEX: 48.58 KG/M2 | HEART RATE: 81 BPM | HEIGHT: 62 IN | WEIGHT: 264 LBS | RESPIRATION RATE: 15 BRPM

## 2025-03-25 DIAGNOSIS — L30.9 ECZEMA, UNSPECIFIED TYPE: ICD-10-CM

## 2025-03-25 DIAGNOSIS — M79.671 RIGHT FOOT PAIN: ICD-10-CM

## 2025-03-25 DIAGNOSIS — F33.0 MILD RECURRENT MAJOR DEPRESSION: ICD-10-CM

## 2025-03-25 DIAGNOSIS — M79.671 RIGHT FOOT PAIN: Primary | ICD-10-CM

## 2025-03-25 PROCEDURE — 3079F DIAST BP 80-89 MM HG: CPT | Performed by: PHYSICIAN ASSISTANT

## 2025-03-25 PROCEDURE — 99214 OFFICE O/P EST MOD 30 MIN: CPT | Performed by: PHYSICIAN ASSISTANT

## 2025-03-25 PROCEDURE — G2211 COMPLEX E/M VISIT ADD ON: HCPCS | Performed by: PHYSICIAN ASSISTANT

## 2025-03-25 PROCEDURE — 3074F SYST BP LT 130 MM HG: CPT | Performed by: PHYSICIAN ASSISTANT

## 2025-03-25 PROCEDURE — 73630 X-RAY EXAM OF FOOT: CPT | Mod: TC | Performed by: STUDENT IN AN ORGANIZED HEALTH CARE EDUCATION/TRAINING PROGRAM

## 2025-03-25 RX ORDER — TRIAMCINOLONE ACETONIDE 1 MG/G
CREAM TOPICAL 2 TIMES DAILY
Qty: 45 G | Refills: 0 | Status: SHIPPED | OUTPATIENT
Start: 2025-03-25

## 2025-03-25 ASSESSMENT — PATIENT HEALTH QUESTIONNAIRE - PHQ9
5. POOR APPETITE OR OVEREATING: NEARLY EVERY DAY
SUM OF ALL RESPONSES TO PHQ QUESTIONS 1-9: 14
SUM OF ALL RESPONSES TO PHQ QUESTIONS 1-9: 14
10. IF YOU CHECKED OFF ANY PROBLEMS, HOW DIFFICULT HAVE THESE PROBLEMS MADE IT FOR YOU TO DO YOUR WORK, TAKE CARE OF THINGS AT HOME, OR GET ALONG WITH OTHER PEOPLE: SOMEWHAT DIFFICULT

## 2025-03-25 ASSESSMENT — ANXIETY QUESTIONNAIRES
1. FEELING NERVOUS, ANXIOUS, OR ON EDGE: SEVERAL DAYS
5. BEING SO RESTLESS THAT IT IS HARD TO SIT STILL: MORE THAN HALF THE DAYS
7. FEELING AFRAID AS IF SOMETHING AWFUL MIGHT HAPPEN: NOT AT ALL
GAD7 TOTAL SCORE: 11
2. NOT BEING ABLE TO STOP OR CONTROL WORRYING: MORE THAN HALF THE DAYS
6. BECOMING EASILY ANNOYED OR IRRITABLE: MORE THAN HALF THE DAYS
GAD7 TOTAL SCORE: 11
IF YOU CHECKED OFF ANY PROBLEMS ON THIS QUESTIONNAIRE, HOW DIFFICULT HAVE THESE PROBLEMS MADE IT FOR YOU TO DO YOUR WORK, TAKE CARE OF THINGS AT HOME, OR GET ALONG WITH OTHER PEOPLE: SOMEWHAT DIFFICULT
3. WORRYING TOO MUCH ABOUT DIFFERENT THINGS: SEVERAL DAYS

## 2025-03-25 NOTE — PROGRESS NOTES
Assessment & Plan     Right foot pain    Likely bruising or strain. Continue supportive cares of ace wrap, ice, and Tylenol. Suspect will continue to improve over the next week.    - XR Foot Right G/E 3 Views; Future      Eczema, unspecified type    Present on hands/fingers. Use steroid cream as needed for flares and encouraged good moisturizing the rest of the time.    - triamcinolone (KENALOG) 0.1 % external cream; Apply topically 2 times daily. Use for up to 2 weeks at a time.      Mild recurrent major depression    Patient would like to restart Prozac. Will restart at lower dose as she has been off for over a year. Follow-up with a primary care provider in 1 month for med check.    - FLUoxetine (PROZAC) 20 MG capsule; Take 1 capsule (20 mg) by mouth daily.      The longitudinal plan of care for the diagnosis(es)/condition(s) as documented were addressed during this visit. Due to the added complexity in care, I will continue to support Concepción in the subsequent management and with ongoing continuity of care.      Depression Screening Follow Up        3/25/2025     7:50 AM   PHQ   PHQ-9 Total Score 14    Q9: Thoughts of better off dead/self-harm past 2 weeks Several days   F/U: Thoughts of suicide or self-harm No   F/U: Safety concerns No       Patient-reported             Follow Up Actions Taken  Crisis resource information provided in the After Visit Summary  Patient to follow up with PCP.  Clinic staff to schedule appointment if able.  Medication started    Discussed the following ways the patient can remain in a safe environment:  be around others        Sharron Beebe is a 40 year old, presenting for the following health issues:  Musculoskeletal Problem (Right foot injury) and Refill Request (FLUoxetine (PROZAC) 40 MG capsule)        3/25/2025     8:00 AM   Additional Questions   Roomed by Cherri HALL     Musculoskeletal Problem    History of Present Illness       Reason for visit:  Foot injury  Symptom onset:   "1-3 days ago She is missing 7 dose(s) of medications per week.  She is not taking prescribed medications regularly due to other.        Triaged 3/24/2025:    Situation: Patient fell on bottom stair Saturday and R foot over- \"twisted it upside down\"- rolled to the outside     Background:   Has been able to walk around but limping and \"cannot walk normally\"  Assessment:   Pain is low when resting/leg is up  Pain 8/10 with walking  Feels pain on bottom outside of foot   Has been elevating and icing- swelling has improved since yesterday  Has taken tylenol for pain    Has gotten a little better since yesterday. Able to move toes today but had a hard time with that yesterday. Has been using a walker and wrapped with an ace wrap. Has been applying ice and taking Tylenol which help some.      Depression and Anxiety   How are you doing with your depression since your last visit? No change  How are you doing with your anxiety since your last visit?  No change  Are you having other symptoms that might be associated with depression or anxiety? Yes:     Have you had a significant life event? No   Do you have any concerns with your use of alcohol or other drugs? No    Social History     Tobacco Use    Smoking status: Never    Smokeless tobacco: Never   Vaping Use    Vaping status: Never Used   Substance Use Topics    Alcohol use: No    Drug use: No         1/10/2022    12:36 PM 3/21/2023     9:45 AM 3/25/2025     7:50 AM   PHQ   PHQ-9 Total Score 11 19 14    Q9: Thoughts of better off dead/self-harm past 2 weeks Not at all More than half the days  Several days   F/U: Thoughts of suicide or self-harm  No  No   F/U: Safety concerns  No  No       Patient-reported    Proxy-reported         10/28/2021     8:12 PM 12/2/2021     9:46 AM 3/25/2025     8:05 AM   OREN-7 SCORE   Total Score 17 (severe anxiety) 13 (moderate anxiety)    Total Score 17 13 11       Follow Up Actions Taken  Crisis resource information provided in the After Visit " "Summary     Discussed the following ways the patient can remain in a safe environment:  be around others  Suicide Assessment Five-step Evaluation and Treatment (SAFE-T)          Review of Systems  Constitutional, HEENT, cardiovascular, pulmonary, gi and gu systems are negative, except as otherwise noted.        Objective    /84 (BP Location: Right arm, Patient Position: Sitting, Cuff Size: Adult Large)   Pulse 81   Temp 98  F (36.7  C) (Temporal)   Resp 15   Ht 1.568 m (5' 1.75\")   Wt 119.7 kg (264 lb)   SpO2 100%   Breastfeeding No   BMI 48.68 kg/m    Body mass index is 48.68 kg/m .      Physical Exam   GENERAL: alert and no distress  EYES: Eyes grossly normal to inspection, PERRL and conjunctivae and sclerae normal  MS: no gross musculoskeletal defects noted, no edema  SKIN: erythema - fingers and hands with dry, flaky skin  NEURO: Normal strength and tone, mentation intact and speech normal  PSYCH: mentation appears normal, affect normal/bright  Right foot: There is no erythema, edema, or ecchymosis. Slightly tender along 5th metatarsal. Otherwise non-tender. ROM intact and without pain.    Xray - Reviewed and interpreted by me.  Negative for fracture per my read.        Signed Electronically by: Josep Madrigal PA-C    "

## 2025-04-05 ENCOUNTER — HEALTH MAINTENANCE LETTER (OUTPATIENT)
Age: 40
End: 2025-04-05

## 2025-05-06 ENCOUNTER — TELEPHONE (OUTPATIENT)
Dept: FAMILY MEDICINE | Facility: CLINIC | Age: 40
End: 2025-05-06
Payer: COMMERCIAL

## 2025-05-06 NOTE — TELEPHONE ENCOUNTER
LM for pt to call back. Scheduled for visit and said to check [employer] for the appt time.

## 2025-05-06 NOTE — TELEPHONE ENCOUNTER
Reason for Call:  Appointment Request    Patient requesting this type of appt:  Preventive     Requested provider: Clinic - [facility] - prefer female provider if possible    Reason patient unable to be scheduled: Not within requested timeframe    When does patient want to be seen/preferred time:  May    Comments: N/A    [facility] or would you prefer to receive a phone call?:   Patient would prefer a phone call   Okay to leave a detailed message?: Yes at Home number on file [phone] (home)    Call taken on 5/6/2025 at 11:47 AM by Alexsandra Jacques

## 2025-06-02 ENCOUNTER — RESULTS FOLLOW-UP (OUTPATIENT)
Dept: FAMILY MEDICINE | Facility: CLINIC | Age: 40
End: 2025-06-02

## 2025-08-26 DIAGNOSIS — F33.0 MILD RECURRENT MAJOR DEPRESSION: ICD-10-CM

## 2025-08-26 RX ORDER — FLUOXETINE 10 MG/1
10 CAPSULE ORAL DAILY
Qty: 30 CAPSULE | Refills: 0 | Status: SHIPPED | OUTPATIENT
Start: 2025-08-26